# Patient Record
Sex: MALE | Race: OTHER | HISPANIC OR LATINO | URBAN - METROPOLITAN AREA
[De-identification: names, ages, dates, MRNs, and addresses within clinical notes are randomized per-mention and may not be internally consistent; named-entity substitution may affect disease eponyms.]

---

## 2022-06-07 ENCOUNTER — INPATIENT (INPATIENT)
Facility: HOSPITAL | Age: 57
LOS: 1 days | Discharge: ANOTHER HOSPITAL | DRG: 311 | End: 2022-06-09
Attending: GENERAL PRACTICE | Admitting: GENERAL PRACTICE
Payer: MEDICAID

## 2022-06-07 VITALS
WEIGHT: 154.98 LBS | TEMPERATURE: 98 F | DIASTOLIC BLOOD PRESSURE: 100 MMHG | SYSTOLIC BLOOD PRESSURE: 151 MMHG | HEIGHT: 66 IN | RESPIRATION RATE: 16 BRPM | HEART RATE: 85 BPM | OXYGEN SATURATION: 98 %

## 2022-06-07 DIAGNOSIS — R94.31 ABNORMAL ELECTROCARDIOGRAM [ECG] [EKG]: ICD-10-CM

## 2022-06-07 DIAGNOSIS — E78.5 HYPERLIPIDEMIA, UNSPECIFIED: ICD-10-CM

## 2022-06-07 DIAGNOSIS — Z29.9 ENCOUNTER FOR PROPHYLACTIC MEASURES, UNSPECIFIED: ICD-10-CM

## 2022-06-07 DIAGNOSIS — Z90.49 ACQUIRED ABSENCE OF OTHER SPECIFIED PARTS OF DIGESTIVE TRACT: Chronic | ICD-10-CM

## 2022-06-07 DIAGNOSIS — I10 ESSENTIAL (PRIMARY) HYPERTENSION: ICD-10-CM

## 2022-06-07 DIAGNOSIS — E11.9 TYPE 2 DIABETES MELLITUS WITHOUT COMPLICATIONS: ICD-10-CM

## 2022-06-07 LAB
ALBUMIN SERPL ELPH-MCNC: 4 G/DL — SIGNIFICANT CHANGE UP (ref 3.5–5)
ALP SERPL-CCNC: 78 U/L — SIGNIFICANT CHANGE UP (ref 40–120)
ALT FLD-CCNC: 57 U/L DA — SIGNIFICANT CHANGE UP (ref 10–60)
ANION GAP SERPL CALC-SCNC: 5 MMOL/L — SIGNIFICANT CHANGE UP (ref 5–17)
AST SERPL-CCNC: 32 U/L — SIGNIFICANT CHANGE UP (ref 10–40)
BASOPHILS # BLD AUTO: 0.06 K/UL — SIGNIFICANT CHANGE UP (ref 0–0.2)
BASOPHILS NFR BLD AUTO: 1.1 % — SIGNIFICANT CHANGE UP (ref 0–2)
BILIRUB SERPL-MCNC: 0.4 MG/DL — SIGNIFICANT CHANGE UP (ref 0.2–1.2)
BUN SERPL-MCNC: 16 MG/DL — SIGNIFICANT CHANGE UP (ref 7–18)
CALCIUM SERPL-MCNC: 9.8 MG/DL — SIGNIFICANT CHANGE UP (ref 8.4–10.5)
CHLORIDE SERPL-SCNC: 100 MMOL/L — SIGNIFICANT CHANGE UP (ref 96–108)
CO2 SERPL-SCNC: 31 MMOL/L — SIGNIFICANT CHANGE UP (ref 22–31)
CREAT SERPL-MCNC: 0.86 MG/DL — SIGNIFICANT CHANGE UP (ref 0.5–1.3)
EGFR: 102 ML/MIN/1.73M2 — SIGNIFICANT CHANGE UP
EOSINOPHIL # BLD AUTO: 0.2 K/UL — SIGNIFICANT CHANGE UP (ref 0–0.5)
EOSINOPHIL NFR BLD AUTO: 3.7 % — SIGNIFICANT CHANGE UP (ref 0–6)
GLUCOSE BLDC GLUCOMTR-MCNC: 292 MG/DL — HIGH (ref 70–99)
GLUCOSE SERPL-MCNC: 217 MG/DL — HIGH (ref 70–99)
HCT VFR BLD CALC: 43.3 % — SIGNIFICANT CHANGE UP (ref 39–50)
HGB BLD-MCNC: 14.7 G/DL — SIGNIFICANT CHANGE UP (ref 13–17)
IMM GRANULOCYTES NFR BLD AUTO: 0.4 % — SIGNIFICANT CHANGE UP (ref 0–1.5)
LIDOCAIN IGE QN: 68 U/L — LOW (ref 73–393)
LYMPHOCYTES # BLD AUTO: 1.65 K/UL — SIGNIFICANT CHANGE UP (ref 1–3.3)
LYMPHOCYTES # BLD AUTO: 30.6 % — SIGNIFICANT CHANGE UP (ref 13–44)
MAGNESIUM SERPL-MCNC: 2.1 MG/DL — SIGNIFICANT CHANGE UP (ref 1.6–2.6)
MCHC RBC-ENTMCNC: 28.1 PG — SIGNIFICANT CHANGE UP (ref 27–34)
MCHC RBC-ENTMCNC: 33.9 GM/DL — SIGNIFICANT CHANGE UP (ref 32–36)
MCV RBC AUTO: 82.6 FL — SIGNIFICANT CHANGE UP (ref 80–100)
MONOCYTES # BLD AUTO: 0.75 K/UL — SIGNIFICANT CHANGE UP (ref 0–0.9)
MONOCYTES NFR BLD AUTO: 13.9 % — SIGNIFICANT CHANGE UP (ref 2–14)
NEUTROPHILS # BLD AUTO: 2.71 K/UL — SIGNIFICANT CHANGE UP (ref 1.8–7.4)
NEUTROPHILS NFR BLD AUTO: 50.3 % — SIGNIFICANT CHANGE UP (ref 43–77)
NRBC # BLD: 0 /100 WBCS — SIGNIFICANT CHANGE UP (ref 0–0)
NT-PROBNP SERPL-SCNC: 10 PG/ML — SIGNIFICANT CHANGE UP (ref 0–125)
PLATELET # BLD AUTO: 301 K/UL — SIGNIFICANT CHANGE UP (ref 150–400)
POTASSIUM SERPL-MCNC: 4.8 MMOL/L — SIGNIFICANT CHANGE UP (ref 3.5–5.3)
POTASSIUM SERPL-SCNC: 4.8 MMOL/L — SIGNIFICANT CHANGE UP (ref 3.5–5.3)
PROT SERPL-MCNC: 7.9 G/DL — SIGNIFICANT CHANGE UP (ref 6–8.3)
RBC # BLD: 5.24 M/UL — SIGNIFICANT CHANGE UP (ref 4.2–5.8)
RBC # FLD: 11.7 % — SIGNIFICANT CHANGE UP (ref 10.3–14.5)
SARS-COV-2 RNA SPEC QL NAA+PROBE: SIGNIFICANT CHANGE UP
SODIUM SERPL-SCNC: 136 MMOL/L — SIGNIFICANT CHANGE UP (ref 135–145)
TROPONIN I, HIGH SENSITIVITY RESULT: 5.4 NG/L — SIGNIFICANT CHANGE UP
TROPONIN I, HIGH SENSITIVITY RESULT: 7.1 NG/L — SIGNIFICANT CHANGE UP
WBC # BLD: 5.39 K/UL — SIGNIFICANT CHANGE UP (ref 3.8–10.5)
WBC # FLD AUTO: 5.39 K/UL — SIGNIFICANT CHANGE UP (ref 3.8–10.5)

## 2022-06-07 PROCEDURE — 71045 X-RAY EXAM CHEST 1 VIEW: CPT | Mod: 26

## 2022-06-07 PROCEDURE — 99285 EMERGENCY DEPT VISIT HI MDM: CPT

## 2022-06-07 RX ORDER — ENOXAPARIN SODIUM 100 MG/ML
40 INJECTION SUBCUTANEOUS EVERY 24 HOURS
Refills: 0 | Status: DISCONTINUED | OUTPATIENT
Start: 2022-06-07 | End: 2022-06-08

## 2022-06-07 RX ORDER — ATORVASTATIN CALCIUM 80 MG/1
40 TABLET, FILM COATED ORAL AT BEDTIME
Refills: 0 | Status: DISCONTINUED | OUTPATIENT
Start: 2022-06-07 | End: 2022-06-09

## 2022-06-07 RX ORDER — ASPIRIN/CALCIUM CARB/MAGNESIUM 324 MG
81 TABLET ORAL DAILY
Refills: 0 | Status: DISCONTINUED | OUTPATIENT
Start: 2022-06-07 | End: 2022-06-09

## 2022-06-07 RX ORDER — INSULIN GLARGINE 100 [IU]/ML
30 INJECTION, SOLUTION SUBCUTANEOUS AT BEDTIME
Refills: 0 | Status: DISCONTINUED | OUTPATIENT
Start: 2022-06-07 | End: 2022-06-09

## 2022-06-07 RX ORDER — INSULIN LISPRO 100/ML
VIAL (ML) SUBCUTANEOUS AT BEDTIME
Refills: 0 | Status: DISCONTINUED | OUTPATIENT
Start: 2022-06-07 | End: 2022-06-09

## 2022-06-07 RX ORDER — LISINOPRIL 2.5 MG/1
20 TABLET ORAL DAILY
Refills: 0 | Status: DISCONTINUED | OUTPATIENT
Start: 2022-06-07 | End: 2022-06-09

## 2022-06-07 RX ORDER — HYDROCHLOROTHIAZIDE 25 MG
25 TABLET ORAL DAILY
Refills: 0 | Status: DISCONTINUED | OUTPATIENT
Start: 2022-06-07 | End: 2022-06-09

## 2022-06-07 RX ORDER — INSULIN LISPRO 100/ML
VIAL (ML) SUBCUTANEOUS
Refills: 0 | Status: DISCONTINUED | OUTPATIENT
Start: 2022-06-07 | End: 2022-06-09

## 2022-06-07 RX ADMIN — Medication 81 MILLIGRAM(S): at 22:53

## 2022-06-07 RX ADMIN — ATORVASTATIN CALCIUM 40 MILLIGRAM(S): 80 TABLET, FILM COATED ORAL at 22:53

## 2022-06-07 RX ADMIN — Medication 2: at 22:54

## 2022-06-07 RX ADMIN — INSULIN GLARGINE 30 UNIT(S): 100 INJECTION, SOLUTION SUBCUTANEOUS at 22:53

## 2022-06-07 NOTE — H&P ADULT - NSHPADDITIONALINFOADULT_GEN_ALL_CORE
Patient evaluated, case discussed with me, chart reviewed, agree with above H/P as reviewed.  Dr. LEONARDO Rivera ([phone])    pt with multiple risk factors including strong family history    likely would benefit from cath  d/w Dr Gage: likely transfer for cath in AM

## 2022-06-07 NOTE — ED PROVIDER NOTE - OBJECTIVE STATEMENT
55 yo male h/o HTN, HLD, IDDM p/w abnormal EKG from cardiologist Dr. Stewart office. Pt c/w MD pt has new TWI on EKG which he did not before. Has not acute complaints, Sent for admission for inpatient cardiac w/u. No cp, sob, dizziness or nausea. No f/c/cough. last stress test many years ago per pt.

## 2022-06-07 NOTE — H&P ADULT - PROBLEM SELECTOR PLAN 1
-c/w T wave inversion in lead II, III, AVF and lead V5 and V6 but not more than 1mm   -Asymptomatic  -Telemonitoring  -HEART score  -CHRISTO Score  -Will obtain ECHO  -Cardio Dr. Gage  -Pt will benefit with stress test , will do provider to RN for no coffee or tea or chocolate in morning -c/w T wave inversion in lead II, III, AVF and lead V5 and V6 but not more than 1mm   -Asymptomatic  -Telemonitoring  -HEART score 4 points moderate score (risk of amce of 12-16.6%)  -Will obtain ECHO  -Cardio Dr. Gage  -Pt will benefit with stress test , will do provider to RN for no coffee or tea or chocolate in morning -c/w T wave inversion in lead II, III, AVF and lead V5 and V6 but not more than 1mm   -Asymptomatic  -Telemonitoring  -HEART score 4 points moderate score (risk of amce of 12-16.6%)  -Will obtain ECHO  -Cardio Dr. Gage  - stress test vs cath         will do provider to RN for no coffee or tea or chocolate in morning

## 2022-06-07 NOTE — PATIENT PROFILE ADULT - FALL HARM RISK - UNIVERSAL INTERVENTIONS
Bed in lowest position, wheels locked, appropriate side rails in place/Call bell, personal items and telephone in reach/Instruct patient to call for assistance before getting out of bed or chair/Non-slip footwear when patient is out of bed/[place] to call system/Physically safe environment - no spills, clutter or unnecessary equipment/Purposeful Proactive Rounding/Room/bathroom lighting operational, light cord in reach

## 2022-06-07 NOTE — ED PROVIDER NOTE - CLINICAL SUMMARY MEDICAL DECISION MAKING FREE TEXT BOX
57 yo male with no cardiac history here for new t wave inversions at cardiologist office. Will perform labs, cardiac enzymes and admit for cardiac w/u.

## 2022-06-07 NOTE — H&P ADULT - HISTORY OF PRESENT ILLNESS
57 yo M, from home, ambulates independently, sent by his cardiologist for T wave inversion. Pt stated that he went to see his cardiologist today and found to have abnormal EKG. Pt denied having any chest pain,  N/V/D, headache, stents/CABG in the past. Endorsed to have mild shortness of breadth on exertion but not every time. Pt stated that last time he saw cardiologist 2 years ago and usually his PMD asked him to go and see cardio given his medical illnesses. he was hospitalized 10 years ago for ACS and had echo which was normal. Never had stress test in his life. Father had stent at the age of 50 or 55. Never smoke.  cardiologist Dr. Stewart   ED course: EKG showed TWI II, III, AVF, V5 and V6 , Trop neg x1

## 2022-06-07 NOTE — ED ADULT NURSE NOTE - ED STAT RN HANDOFF DETAILS
received pt.in bed at 1910 from madalyn lang/.pt.is awake and responsive.denies pain., on CM with NSR. transfer to rm 50A report given to madalyn taylor. not in distress

## 2022-06-07 NOTE — ED ADULT NURSE NOTE - OBJECTIVE STATEMENT
sent by pmd for abnormal EKG , pt states went to see pmd today just for regular check up   pt denies chest pains/ discomforts

## 2022-06-07 NOTE — H&P ADULT - NSHPPHYSICALEXAM_GEN_ALL_CORE
Vital Signs Last 24 Hrs  T(C): 36.8 (07 Jun 2022 16:30), Max: 36.8 (07 Jun 2022 16:30)  T(F): 98.2 (07 Jun 2022 16:30), Max: 98.2 (07 Jun 2022 16:30)  HR: 71 (07 Jun 2022 16:30) (71 - 85)  BP: 135/87 (07 Jun 2022 16:30) (135/87 - 151/100)  BP(mean): --  RR: 18 (07 Jun 2022 16:30) (16 - 18)  SpO2: 98% (07 Jun 2022 16:30) (98% - 98%)    GENERAL: NAD  EYES: EOMI, PERRLA,   NECK: Supple, No JVD  CHEST/LUNG: Clear to auscultation b/l  No rales, rhonchi, wheezing   HEART: Regular rate and rhythm; No murmurs, +ve S1 S2   ABDOMEN: Soft, Nontender, Nondistended; Bowel sounds present  NERVOUS SYSTEM:  Alert & Oriented X3, normal sensations and normal strength     EXTREMITIES:   No clubbing, cyanosis, or edema

## 2022-06-07 NOTE — H&P ADULT - PROBLEM SELECTOR PLAN 2
-On insulin glargine 60units at bedtime, Gemfibrozil 600mg BID, alogliptin -met 12.5-1000 BID  -Will resume 30 units of glargine with moderate sliding scale   -f/u hgbA1C  -FS achs

## 2022-06-07 NOTE — H&P ADULT - ASSESSMENT
55 yo M, from home, ambulates independently, sent by his cardiologist for T wave inversion. Pt stated that he went to see his cardiologist today and found to have abnormal EKG. Admitted for cardiac eval

## 2022-06-07 NOTE — H&P ADULT - NSICDXFAMILYHX_GEN_ALL_CORE_FT
FAMILY HISTORY:  Father  Still living? Unknown  Family history of stent, Age at diagnosis: Age Unknown

## 2022-06-08 LAB
A1C WITH ESTIMATED AVERAGE GLUCOSE RESULT: 12 % — HIGH (ref 4–5.6)
ALBUMIN SERPL ELPH-MCNC: 3.3 G/DL — LOW (ref 3.5–5)
ALP SERPL-CCNC: 66 U/L — SIGNIFICANT CHANGE UP (ref 40–120)
ALT FLD-CCNC: 55 U/L DA — SIGNIFICANT CHANGE UP (ref 10–60)
ANION GAP SERPL CALC-SCNC: 4 MMOL/L — LOW (ref 5–17)
AST SERPL-CCNC: 32 U/L — SIGNIFICANT CHANGE UP (ref 10–40)
BILIRUB SERPL-MCNC: 0.4 MG/DL — SIGNIFICANT CHANGE UP (ref 0.2–1.2)
BUN SERPL-MCNC: 16 MG/DL — SIGNIFICANT CHANGE UP (ref 7–18)
CALCIUM SERPL-MCNC: 9 MG/DL — SIGNIFICANT CHANGE UP (ref 8.4–10.5)
CHLORIDE SERPL-SCNC: 103 MMOL/L — SIGNIFICANT CHANGE UP (ref 96–108)
CHOLEST SERPL-MCNC: 157 MG/DL — SIGNIFICANT CHANGE UP
CO2 SERPL-SCNC: 30 MMOL/L — SIGNIFICANT CHANGE UP (ref 22–31)
CREAT SERPL-MCNC: 0.76 MG/DL — SIGNIFICANT CHANGE UP (ref 0.5–1.3)
EGFR: 106 ML/MIN/1.73M2 — SIGNIFICANT CHANGE UP
ESTIMATED AVERAGE GLUCOSE: 298 MG/DL — HIGH (ref 68–114)
GLUCOSE BLDC GLUCOMTR-MCNC: 147 MG/DL — HIGH (ref 70–99)
GLUCOSE BLDC GLUCOMTR-MCNC: 182 MG/DL — HIGH (ref 70–99)
GLUCOSE BLDC GLUCOMTR-MCNC: 235 MG/DL — HIGH (ref 70–99)
GLUCOSE BLDC GLUCOMTR-MCNC: 314 MG/DL — HIGH (ref 70–99)
GLUCOSE SERPL-MCNC: 185 MG/DL — HIGH (ref 70–99)
HCT VFR BLD CALC: 41 % — SIGNIFICANT CHANGE UP (ref 39–50)
HDLC SERPL-MCNC: 28 MG/DL — LOW
HGB BLD-MCNC: 13.9 G/DL — SIGNIFICANT CHANGE UP (ref 13–17)
LIPID PNL WITH DIRECT LDL SERPL: 72 MG/DL — SIGNIFICANT CHANGE UP
MAGNESIUM SERPL-MCNC: 2.1 MG/DL — SIGNIFICANT CHANGE UP (ref 1.6–2.6)
MCHC RBC-ENTMCNC: 28.2 PG — SIGNIFICANT CHANGE UP (ref 27–34)
MCHC RBC-ENTMCNC: 33.9 GM/DL — SIGNIFICANT CHANGE UP (ref 32–36)
MCV RBC AUTO: 83.2 FL — SIGNIFICANT CHANGE UP (ref 80–100)
NON HDL CHOLESTEROL: 129 MG/DL — SIGNIFICANT CHANGE UP
NRBC # BLD: 0 /100 WBCS — SIGNIFICANT CHANGE UP (ref 0–0)
PHOSPHATE SERPL-MCNC: 3.8 MG/DL — SIGNIFICANT CHANGE UP (ref 2.5–4.5)
PLATELET # BLD AUTO: 266 K/UL — SIGNIFICANT CHANGE UP (ref 150–400)
POTASSIUM SERPL-MCNC: 3.9 MMOL/L — SIGNIFICANT CHANGE UP (ref 3.5–5.3)
POTASSIUM SERPL-SCNC: 3.9 MMOL/L — SIGNIFICANT CHANGE UP (ref 3.5–5.3)
PROT SERPL-MCNC: 6.5 G/DL — SIGNIFICANT CHANGE UP (ref 6–8.3)
RBC # BLD: 4.93 M/UL — SIGNIFICANT CHANGE UP (ref 4.2–5.8)
RBC # FLD: 11.7 % — SIGNIFICANT CHANGE UP (ref 10.3–14.5)
SODIUM SERPL-SCNC: 137 MMOL/L — SIGNIFICANT CHANGE UP (ref 135–145)
TRIGL SERPL-MCNC: 285 MG/DL — HIGH
WBC # BLD: 4.49 K/UL — SIGNIFICANT CHANGE UP (ref 3.8–10.5)
WBC # FLD AUTO: 4.49 K/UL — SIGNIFICANT CHANGE UP (ref 3.8–10.5)

## 2022-06-08 RX ADMIN — Medication 4: at 17:06

## 2022-06-08 RX ADMIN — ENOXAPARIN SODIUM 40 MILLIGRAM(S): 100 INJECTION SUBCUTANEOUS at 06:17

## 2022-06-08 RX ADMIN — LISINOPRIL 20 MILLIGRAM(S): 2.5 TABLET ORAL at 06:17

## 2022-06-08 RX ADMIN — Medication 25 MILLIGRAM(S): at 06:17

## 2022-06-08 RX ADMIN — Medication 8: at 12:15

## 2022-06-08 RX ADMIN — INSULIN GLARGINE 30 UNIT(S): 100 INJECTION, SOLUTION SUBCUTANEOUS at 22:03

## 2022-06-08 RX ADMIN — Medication 81 MILLIGRAM(S): at 16:08

## 2022-06-08 RX ADMIN — ATORVASTATIN CALCIUM 40 MILLIGRAM(S): 80 TABLET, FILM COATED ORAL at 22:03

## 2022-06-08 NOTE — DISCHARGE NOTE PROVIDER - HOSPITAL COURSE
57 yo M, from home, ambulates independently, sent by his cardiologist for T wave inversion. Pt stated that he went to see his cardiolgist today and found to have abnormal EKG. Pt denied having any chest pain,  N/V/D, headache, stents/CABG in the past. Endorsed to have mild shortness of breadth on exertion but not everytime. Pt stated that last time he saw cardiologist 2 years ago and usually his PMD asked him to go and see cardio given his medical illnesses. he was hospitlized 10 years ago for ACS and had echo which was normal. Never had stress test in his life. Father had stent at the age of 50 or 55. Never smoke.    ED course: EKG showed   , Trop neg x1     Abnormal EKG. c/w T wave inversion in lead II, III, AVF and lead V5 and V6 but not more than 1mm. Asymptomatic. Telemonitoring. HEART score 4 points moderate score (risk of amce of 12-16.6%). Will obtain ECHO. Cardio Dr. Gage. Transfer to [place] for cath     Diabetes mellitus. insulin glargine 60units at bedtime, Gemfibrozil 600mg BID, alogliptin -met 12.5-1000 BID. Will resume 30 units of glargine with moderate sliding scale. f/u hgbA1C. FS achs.    HLD (hyperlipidemia). On Lipitor. Resume home med. f/u lipid profile.    HTN (hypertension). On lisinopril -HCTz. Resume home med. Monitor BP.    DVT prophylaxis. Lovenox dvt ppx.

## 2022-06-08 NOTE — DISCHARGE NOTE PROVIDER - NSDCMRMEDTOKEN_GEN_ALL_CORE_FT
alogliptin-metformin 12.5 mg-1000 mg oral tablet: 1 tab(s) orally 2 times a day  aspirin 81 mg oral delayed release tablet: 1 tab(s) orally once a day  gemfibrozil 600 mg oral tablet: 1 tab(s) orally 2 times a day  insulin glargine 100 units/mL subcutaneous solution: 60 unit(s) subcutaneous once a day (at bedtime)  Lipitor 40 mg oral tablet: 1 tab(s) orally once a day  lisinopril-hydrochlorothiazide 20 mg-25 mg oral tablet: 1 tab(s) orally once a day  Vitamin B12 1000 mcg oral tablet: 1 tab(s) orally once a day  Vitamin D3 25 mcg (1000 intl units) oral tablet: 1 tab(s) orally once a day   aspirin 81 mg oral delayed release tablet: 1 tab(s) orally once a day  clopidogrel 75 mg oral tablet: 1 tab(s) orally once a day  gemfibrozil 600 mg oral tablet: 1 tab(s) orally 2 times a day  insulin glargine 100 units/mL subcutaneous solution: 48 unit(s) subcutaneous once a day (at bedtime)  Lipitor 40 mg oral tablet: 1 tab(s) orally once a day  lisinopril-hydrochlorothiazide 20 mg-25 mg oral tablet: 1 tab(s) orally once a day  metFORMIN 1000 mg oral tablet: 1 tab(s) orally 2 times a day   HOLD restart on 6/12/2022  metoprolol tartrate 25 mg oral tablet: 1 tab(s) orally every 12 hours  Steglatro 5 mg oral tablet: 1 tab(s) orally once a day MDD:1  Vitamin B12 1000 mcg oral tablet: 1 tab(s) orally once a day  Vitamin D3 25 mcg (1000 intl units) oral tablet: 1 tab(s) orally once a day

## 2022-06-08 NOTE — DISCHARGE NOTE PROVIDER - NSDCCAREPROVSEEN_GEN_ALL_CORE_FT
Kristina, Pro Rivera, Brandon Gauthier, Karey Gage, Mainor Maya, Kimberly Terrell, Adia MEADOWS Brandon Miller

## 2022-06-08 NOTE — DISCHARGE NOTE PROVIDER - NSDCCPCAREPLAN_GEN_ALL_CORE_FT
PRINCIPAL DISCHARGE DIAGNOSIS  Diagnosis: ACS (acute coronary syndrome)  Assessment and Plan of Treatment: You presented to the hospital with t wave inversions sent in by your cardiologist. Your troponins were negative. Your EKG showed w T wave inversion in lead II, III, AVF and lead V5 and V6 but not more than 1mm. Cardiologist Dr. Gage was consulted. You are to be transferred to [facility] for cardiac cath for ischemic heart evaluation.      SECONDARY DISCHARGE DIAGNOSES  Diagnosis: Diabetes mellitus  Assessment and Plan of Treatment: You were on insuling sliding scale in hospital. Resume home meds upon discharge to home.    Diagnosis: HLD (hyperlipidemia)  Assessment and Plan of Treatment: You have history of hyperlipidemia. Continue home meds on discharge.    Diagnosis: HTN (hypertension)  Assessment and Plan of Treatment: You have history of hypertension. Continue home meds on discharge.

## 2022-06-09 ENCOUNTER — INPATIENT (INPATIENT)
Facility: HOSPITAL | Age: 57
LOS: 0 days | Discharge: HOME | DRG: 247 | End: 2022-06-10
Attending: STUDENT IN AN ORGANIZED HEALTH CARE EDUCATION/TRAINING PROGRAM | Admitting: STUDENT IN AN ORGANIZED HEALTH CARE EDUCATION/TRAINING PROGRAM
Payer: MEDICAID

## 2022-06-09 VITALS
TEMPERATURE: 98 F | DIASTOLIC BLOOD PRESSURE: 73 MMHG | OXYGEN SATURATION: 95 % | RESPIRATION RATE: 16 BRPM | SYSTOLIC BLOOD PRESSURE: 115 MMHG | HEART RATE: 77 BPM

## 2022-06-09 VITALS
OXYGEN SATURATION: 97 % | SYSTOLIC BLOOD PRESSURE: 139 MMHG | DIASTOLIC BLOOD PRESSURE: 89 MMHG | RESPIRATION RATE: 16 BRPM | HEART RATE: 92 BPM | WEIGHT: 151.02 LBS | HEIGHT: 66 IN | TEMPERATURE: 98 F

## 2022-06-09 DIAGNOSIS — Z90.49 ACQUIRED ABSENCE OF OTHER SPECIFIED PARTS OF DIGESTIVE TRACT: Chronic | ICD-10-CM

## 2022-06-09 DIAGNOSIS — I25.10 ATHEROSCLEROTIC HEART DISEASE OF NATIVE CORONARY ARTERY WITHOUT ANGINA PECTORIS: ICD-10-CM

## 2022-06-09 PROBLEM — E11.9 TYPE 2 DIABETES MELLITUS WITHOUT COMPLICATIONS: Chronic | Status: ACTIVE | Noted: 2022-06-07

## 2022-06-09 PROBLEM — E78.5 HYPERLIPIDEMIA, UNSPECIFIED: Chronic | Status: ACTIVE | Noted: 2022-06-07

## 2022-06-09 PROBLEM — I10 ESSENTIAL (PRIMARY) HYPERTENSION: Chronic | Status: ACTIVE | Noted: 2022-06-07

## 2022-06-09 LAB
ALBUMIN SERPL ELPH-MCNC: 3.5 G/DL — SIGNIFICANT CHANGE UP (ref 3.5–5)
ALP SERPL-CCNC: 70 U/L — SIGNIFICANT CHANGE UP (ref 40–120)
ALT FLD-CCNC: 60 U/L DA — SIGNIFICANT CHANGE UP (ref 10–60)
ANION GAP SERPL CALC-SCNC: 5 MMOL/L — SIGNIFICANT CHANGE UP (ref 5–17)
AST SERPL-CCNC: 32 U/L — SIGNIFICANT CHANGE UP (ref 10–40)
BILIRUB SERPL-MCNC: 0.5 MG/DL — SIGNIFICANT CHANGE UP (ref 0.2–1.2)
BUN SERPL-MCNC: 18 MG/DL — SIGNIFICANT CHANGE UP (ref 7–18)
CALCIUM SERPL-MCNC: 9.5 MG/DL — SIGNIFICANT CHANGE UP (ref 8.4–10.5)
CHLORIDE SERPL-SCNC: 103 MMOL/L — SIGNIFICANT CHANGE UP (ref 96–108)
CO2 SERPL-SCNC: 29 MMOL/L — SIGNIFICANT CHANGE UP (ref 22–31)
CREAT SERPL-MCNC: 0.84 MG/DL — SIGNIFICANT CHANGE UP (ref 0.5–1.3)
EGFR: 103 ML/MIN/1.73M2 — SIGNIFICANT CHANGE UP
GLUCOSE BLDC GLUCOMTR-MCNC: 166 MG/DL — HIGH (ref 70–99)
GLUCOSE BLDC GLUCOMTR-MCNC: 217 MG/DL — HIGH (ref 70–99)
GLUCOSE BLDC GLUCOMTR-MCNC: 287 MG/DL — HIGH (ref 70–99)
GLUCOSE BLDC GLUCOMTR-MCNC: 294 MG/DL — HIGH (ref 70–99)
GLUCOSE SERPL-MCNC: 158 MG/DL — HIGH (ref 70–99)
POTASSIUM SERPL-MCNC: 4.3 MMOL/L — SIGNIFICANT CHANGE UP (ref 3.5–5.3)
POTASSIUM SERPL-SCNC: 4.3 MMOL/L — SIGNIFICANT CHANGE UP (ref 3.5–5.3)
PROT SERPL-MCNC: 6.9 G/DL — SIGNIFICANT CHANGE UP (ref 6–8.3)
SODIUM SERPL-SCNC: 137 MMOL/L — SIGNIFICANT CHANGE UP (ref 135–145)

## 2022-06-09 PROCEDURE — 93010 ELECTROCARDIOGRAM REPORT: CPT

## 2022-06-09 PROCEDURE — 92928 PRQ TCAT PLMT NTRAC ST 1 LES: CPT | Mod: LD

## 2022-06-09 PROCEDURE — 36415 COLL VENOUS BLD VENIPUNCTURE: CPT

## 2022-06-09 PROCEDURE — 99285 EMERGENCY DEPT VISIT HI MDM: CPT

## 2022-06-09 PROCEDURE — 82962 GLUCOSE BLOOD TEST: CPT

## 2022-06-09 PROCEDURE — 83880 ASSAY OF NATRIURETIC PEPTIDE: CPT

## 2022-06-09 PROCEDURE — 84484 ASSAY OF TROPONIN QUANT: CPT

## 2022-06-09 PROCEDURE — 80061 LIPID PANEL: CPT

## 2022-06-09 PROCEDURE — 83690 ASSAY OF LIPASE: CPT

## 2022-06-09 PROCEDURE — 83735 ASSAY OF MAGNESIUM: CPT

## 2022-06-09 PROCEDURE — 87635 SARS-COV-2 COVID-19 AMP PRB: CPT

## 2022-06-09 PROCEDURE — 93005 ELECTROCARDIOGRAM TRACING: CPT

## 2022-06-09 PROCEDURE — 80053 COMPREHEN METABOLIC PANEL: CPT

## 2022-06-09 PROCEDURE — 93454 CORONARY ARTERY ANGIO S&I: CPT | Mod: 26,59

## 2022-06-09 PROCEDURE — 99152 MOD SED SAME PHYS/QHP 5/>YRS: CPT

## 2022-06-09 PROCEDURE — 93306 TTE W/DOPPLER COMPLETE: CPT

## 2022-06-09 PROCEDURE — 84100 ASSAY OF PHOSPHORUS: CPT

## 2022-06-09 PROCEDURE — 83036 HEMOGLOBIN GLYCOSYLATED A1C: CPT

## 2022-06-09 PROCEDURE — 85025 COMPLETE CBC W/AUTO DIFF WBC: CPT

## 2022-06-09 PROCEDURE — 85027 COMPLETE CBC AUTOMATED: CPT

## 2022-06-09 RX ORDER — HYDROCHLOROTHIAZIDE 25 MG
25 TABLET ORAL DAILY
Refills: 0 | Status: DISCONTINUED | OUTPATIENT
Start: 2022-06-09 | End: 2022-06-10

## 2022-06-09 RX ORDER — ATORVASTATIN CALCIUM 80 MG/1
1 TABLET, FILM COATED ORAL
Qty: 0 | Refills: 0 | DISCHARGE

## 2022-06-09 RX ORDER — LISINOPRIL 2.5 MG/1
20 TABLET ORAL DAILY
Refills: 0 | Status: DISCONTINUED | OUTPATIENT
Start: 2022-06-09 | End: 2022-06-10

## 2022-06-09 RX ORDER — CHOLECALCIFEROL (VITAMIN D3) 125 MCG
1 CAPSULE ORAL
Qty: 0 | Refills: 0 | DISCHARGE

## 2022-06-09 RX ORDER — SODIUM CHLORIDE 9 MG/ML
1000 INJECTION INTRAMUSCULAR; INTRAVENOUS; SUBCUTANEOUS
Refills: 0 | Status: DISCONTINUED | OUTPATIENT
Start: 2022-06-09 | End: 2022-06-10

## 2022-06-09 RX ORDER — ASPIRIN/CALCIUM CARB/MAGNESIUM 324 MG
81 TABLET ORAL DAILY
Refills: 0 | Status: DISCONTINUED | OUTPATIENT
Start: 2022-06-09 | End: 2022-06-10

## 2022-06-09 RX ORDER — LISINOPRIL/HYDROCHLOROTHIAZIDE 10-12.5 MG
1 TABLET ORAL
Qty: 0 | Refills: 0 | DISCHARGE

## 2022-06-09 RX ORDER — PREGABALIN 225 MG/1
1000 CAPSULE ORAL DAILY
Refills: 0 | Status: DISCONTINUED | OUTPATIENT
Start: 2022-06-09 | End: 2022-06-10

## 2022-06-09 RX ORDER — SODIUM CHLORIDE 9 MG/ML
3 INJECTION INTRAMUSCULAR; INTRAVENOUS; SUBCUTANEOUS EVERY 8 HOURS
Refills: 0 | Status: DISCONTINUED | OUTPATIENT
Start: 2022-06-09 | End: 2022-06-10

## 2022-06-09 RX ORDER — ASPIRIN/CALCIUM CARB/MAGNESIUM 324 MG
1 TABLET ORAL
Qty: 0 | Refills: 0 | DISCHARGE

## 2022-06-09 RX ORDER — PREGABALIN 225 MG/1
1 CAPSULE ORAL
Qty: 0 | Refills: 0 | DISCHARGE

## 2022-06-09 RX ORDER — INSULIN GLARGINE 100 [IU]/ML
48 INJECTION, SOLUTION SUBCUTANEOUS AT BEDTIME
Refills: 0 | Status: DISCONTINUED | OUTPATIENT
Start: 2022-06-09 | End: 2022-06-10

## 2022-06-09 RX ORDER — CHOLECALCIFEROL (VITAMIN D3) 125 MCG
1000 CAPSULE ORAL DAILY
Refills: 0 | Status: DISCONTINUED | OUTPATIENT
Start: 2022-06-09 | End: 2022-06-10

## 2022-06-09 RX ORDER — DEXTROSE 50 % IN WATER 50 %
15 SYRINGE (ML) INTRAVENOUS ONCE
Refills: 0 | Status: DISCONTINUED | OUTPATIENT
Start: 2022-06-09 | End: 2022-06-10

## 2022-06-09 RX ORDER — GEMFIBROZIL 600 MG
1 TABLET ORAL
Qty: 0 | Refills: 0 | DISCHARGE

## 2022-06-09 RX ORDER — DEXTROSE 50 % IN WATER 50 %
25 SYRINGE (ML) INTRAVENOUS ONCE
Refills: 0 | Status: DISCONTINUED | OUTPATIENT
Start: 2022-06-09 | End: 2022-06-10

## 2022-06-09 RX ORDER — SODIUM CHLORIDE 9 MG/ML
1000 INJECTION, SOLUTION INTRAVENOUS
Refills: 0 | Status: DISCONTINUED | OUTPATIENT
Start: 2022-06-09 | End: 2022-06-10

## 2022-06-09 RX ORDER — GEMFIBROZIL 600 MG
600 TABLET ORAL
Refills: 0 | Status: DISCONTINUED | OUTPATIENT
Start: 2022-06-09 | End: 2022-06-10

## 2022-06-09 RX ORDER — ATORVASTATIN CALCIUM 80 MG/1
40 TABLET, FILM COATED ORAL AT BEDTIME
Refills: 0 | Status: DISCONTINUED | OUTPATIENT
Start: 2022-06-09 | End: 2022-06-10

## 2022-06-09 RX ORDER — INSULIN LISPRO 100/ML
VIAL (ML) SUBCUTANEOUS AT BEDTIME
Refills: 0 | Status: DISCONTINUED | OUTPATIENT
Start: 2022-06-09 | End: 2022-06-10

## 2022-06-09 RX ORDER — GLUCAGON INJECTION, SOLUTION 0.5 MG/.1ML
1 INJECTION, SOLUTION SUBCUTANEOUS ONCE
Refills: 0 | Status: DISCONTINUED | OUTPATIENT
Start: 2022-06-09 | End: 2022-06-10

## 2022-06-09 RX ORDER — SODIUM CHLORIDE 9 MG/ML
250 INJECTION INTRAMUSCULAR; INTRAVENOUS; SUBCUTANEOUS ONCE
Refills: 0 | Status: COMPLETED | OUTPATIENT
Start: 2022-06-09 | End: 2022-06-09

## 2022-06-09 RX ORDER — DEXTROSE 50 % IN WATER 50 %
12.5 SYRINGE (ML) INTRAVENOUS ONCE
Refills: 0 | Status: DISCONTINUED | OUTPATIENT
Start: 2022-06-09 | End: 2022-06-10

## 2022-06-09 RX ORDER — SODIUM CHLORIDE 9 MG/ML
500 INJECTION INTRAMUSCULAR; INTRAVENOUS; SUBCUTANEOUS ONCE
Refills: 0 | Status: COMPLETED | OUTPATIENT
Start: 2022-06-09 | End: 2022-06-09

## 2022-06-09 RX ORDER — INSULIN LISPRO 100/ML
VIAL (ML) SUBCUTANEOUS
Refills: 0 | Status: DISCONTINUED | OUTPATIENT
Start: 2022-06-09 | End: 2022-06-10

## 2022-06-09 RX ADMIN — Medication 4: at 12:00

## 2022-06-09 RX ADMIN — SODIUM CHLORIDE 3 MILLILITER(S): 9 INJECTION INTRAMUSCULAR; INTRAVENOUS; SUBCUTANEOUS at 21:22

## 2022-06-09 RX ADMIN — ATORVASTATIN CALCIUM 40 MILLIGRAM(S): 80 TABLET, FILM COATED ORAL at 21:17

## 2022-06-09 RX ADMIN — Medication 6: at 15:36

## 2022-06-09 RX ADMIN — SODIUM CHLORIDE 500 MILLILITER(S): 9 INJECTION INTRAMUSCULAR; INTRAVENOUS; SUBCUTANEOUS at 16:16

## 2022-06-09 RX ADMIN — SODIUM CHLORIDE 75 MILLILITER(S): 9 INJECTION INTRAMUSCULAR; INTRAVENOUS; SUBCUTANEOUS at 16:05

## 2022-06-09 RX ADMIN — Medication 81 MILLIGRAM(S): at 12:01

## 2022-06-09 RX ADMIN — LISINOPRIL 20 MILLIGRAM(S): 2.5 TABLET ORAL at 05:33

## 2022-06-09 RX ADMIN — SODIUM CHLORIDE 83.33 MILLILITER(S): 9 INJECTION INTRAMUSCULAR; INTRAVENOUS; SUBCUTANEOUS at 21:14

## 2022-06-09 RX ADMIN — INSULIN GLARGINE 48 UNIT(S): 100 INJECTION, SOLUTION SUBCUTANEOUS at 21:14

## 2022-06-09 RX ADMIN — Medication 25 MILLIGRAM(S): at 05:33

## 2022-06-09 RX ADMIN — Medication 2: at 08:17

## 2022-06-09 RX ADMIN — Medication 1: at 21:15

## 2022-06-09 NOTE — PROGRESS NOTE ADULT - ASSESSMENT
M E D I C A L   A T T E N D I N G    F O L L O W    U P   N O T E  (06-09-22 )                                     ------------------------------------------------------------------------------------------------    patient evaluated by me, case discussed with team, chart, medications, and physical exam reviewed, labs / tests  and vitals reviewed by me, as doni.   Patient is stable for transfer to [place] for cardiac cath   Patient to follow up with  cadio team at [place]   See discharge document for full note.  dicussed with house staff.. cardio ..                             ( note written / Date of service  06-09-22 )    ==================>> MEDICATIONS <<====================    aspirin enteric coated 81 milliGRAM(s) Oral daily  atorvastatin 40 milliGRAM(s) Oral at bedtime  hydrochlorothiazide 25 milliGRAM(s) Oral daily  insulin glargine Injectable (LANTUS) 30 Unit(s) SubCutaneous at bedtime  insulin lispro (ADMELOG) corrective regimen sliding scale   SubCutaneous three times a day before meals  insulin lispro (ADMELOG) corrective regimen sliding scale   SubCutaneous at bedtime  lisinopril 20 milliGRAM(s) Oral daily    ___________  Active diet:  Diet, Regular:   Consistent Carbohydrate No Snacks  DASH/TLC Sodium & Cholesterol Restricted  ___________________    ==================>> VITAL SIGNS <<==================    T(C): 36.6 (06-09-22 @ 11:16), Max: 36.8 (06-08-22 @ 15:32)  HR: 77 (06-09-22 @ 11:16) (76 - 99)  BP: 115/73 (06-09-22 @ 11:16) (106/69 - 142/91)  BP(mean): --  RR: 16 (06-09-22 @ 11:16) (16 - 20)  SpO2: 95% (06-09-22 @ 11:16) (95% - 98%)     CAPILLARY BLOOD GLUCOSE  POCT Blood Glucose.: 217 mg/dL (09 Jun 2022 11:44)  POCT Blood Glucose.: 166 mg/dL (09 Jun 2022 07:49)  POCT Blood Glucose.: 147 mg/dL (08 Jun 2022 21:08)  POCT Blood Glucose.: 235 mg/dL (08 Jun 2022 16:29)     ==================>> LAB AND IMAGING <<==================                        13.9   4.49  )-----------( 266      ( 08 Jun 2022 06:27 )             41.0        06-09    137  |  103  |  18  ----------------------------<  158<H>  4.3   |  29  |  0.84    Ca    9.5      09 Jun 2022 07:02  Phos  3.8     06-08  Mg     2.1     06-08    TPro  6.9  /  Alb  3.5  /  TBili  0.5  /  DBili  x   /  AST  32  /  ALT  60  /  AlkPhos  70  06-09    Lipid profile:  (06-08-22)     Total: 157     LDL  : (p)     HDL  :28     TG   :285     HgA1C:   (06-08-22)            (06-08-22)      12.0  WBC count:   4.49 <<== ,  5.39 <<==   Hemoglobin:   13.9 <<==,  14.7 <<==  platelets:  266 <==, 301 <==    Creatinine:  0.84  <<==, 0.76  <<==, 0.86  <<==  Sodium:   137  <==, 137  <==, 136  <==       AST:          32 <== , 32 <== , 32 <==      ALT:        60  <== , 55  <== , 57  <==      AP:        70  <=, 66  <=, 78  <=     Bili:        0.5  <=, 0.4  <=, 0.4  <=       < from: Transthoracic Echocardiogram (06.08.22 @ 07:29) >  CONCLUSIONS:  1. Increased relative wall thickness with normal left  ventricular (LV) mass index, consistent with concentric LV remodeling.  2. Endocardium not well visualized; grossly normal left  ventricular systolic function.  3. Grade I diastolic dysfunction (Impaired relaxation, mild).  4. Normal right ventricular size and function.  < end of copied text >  
  _________________________________________________________________________________________  ========>>  M E D I C A L   A T T E N D I N G    F O L L O W  U P  N O T E  <<=========  -----------------------------------------------------------------------------------------------------    - Patient seen and examined by me earlier today.   - In summary,  YESSI IRVIN is a 56y year old man admitted with   - Patient today overall doing ok, comfortable, eating OK. mild h/a otherwise doing ok     ==================>> REVIEW OF SYSTEM <<=================    GEN: no fever, no chills, no pain  RESP: no SOB, no cough, no sputum  CVS: no chest pain, no palpitations, no edema  GI: no abdominal pain, no nausea, no constipation, no diarrhea  : no dysuria, no frequency, no hematuria  Neuro: no headache, no dizziness  Derm : no itching, no rash    ==================>> PHYSICAL EXAM <<=================    GEN: A&O X 3 , NAD , comfortable, pleasant, calm   HEENT: NCAT, PERRL, MMM, hearing intact  Neck: supple , no JVD appreciated  CVS: S1S2 , regular , No M/R/G appreciated  PULM: CTA B/L,  no W/R/R appreciated  ABD.: soft. non tender, non distended,  bowel sounds present  Extrem: intact pulses , no edema   PSYCH : normal mood,  not anxious                            ( Note Written / Date of service :  06-08-22 )    ==================>> MEDICATIONS <<====================    MEDICATIONS  (STANDING):  aspirin enteric coated 81 milliGRAM(s) Oral daily  atorvastatin 40 milliGRAM(s) Oral at bedtime  enoxaparin Injectable 40 milliGRAM(s) SubCutaneous every 24 hours  hydrochlorothiazide 25 milliGRAM(s) Oral daily  insulin glargine Injectable (LANTUS) 30 Unit(s) SubCutaneous at bedtime  insulin lispro (ADMELOG) corrective regimen sliding scale   SubCutaneous three times a day before meals  insulin lispro (ADMELOG) corrective regimen sliding scale   SubCutaneous at bedtime  lisinopril 20 milliGRAM(s) Oral daily    ___________  Active diet:  Diet, Regular:   Consistent Carbohydrate No Snacks  DASH/TLC Sodium & Cholesterol Restricted  ___________________    ==================>> VITAL SIGNS <<==================    T(C): 36.7 (06-08-22 @ 11:18), Max: 36.8 (06-07-22 @ 16:30)  HR: 86 (06-08-22 @ 11:18) (71 - 86)  BP: 110/70 (06-08-22 @ 11:18) (110/70 - 165/98)  BP(mean): --  RR: 16 (06-08-22 @ 11:18) (16 - 20)  SpO2: 97% (06-08-22 @ 11:18) (97% - 98%)     CAPILLARY BLOOD GLUCOSE  POCT Blood Glucose.: 314 mg/dL (08 Jun 2022 11:56)  POCT Blood Glucose.: 182 mg/dL (08 Jun 2022 08:18)  POCT Blood Glucose.: 292 mg/dL (07 Jun 2022 22:51)     ==================>> LAB AND IMAGING <<==================                        13.9   4.49  )-----------( 266      ( 08 Jun 2022 06:27 )             41.0        06-08    137  |  103  |  16  ----------------------------<  185<H>  3.9   |  30  |  0.76    Ca    9.0      08 Jun 2022 06:27  Phos  3.8     06-08  Mg     2.1     06-08    TPro  6.5  /  Alb  3.3<L>  /  TBili  0.4  /  DBili  x   /  AST  32  /  ALT  55  /  AlkPhos  66  06-08                Lipid profile:  (06-08-22)     Total: 157     LDL  : (p)     HDL  :28     TG   :285     HgA1C:   (06-08-22)          (06-08-22)      12.0    ___________________________________________________________________________________  ===============>>  A S S E S S M E N T   A N D   P L A N <<===============  ------------------------------------------------------------------------------------------    · Assessment	  57 yo M, from home, ambulates independently, sent by his cardiologist for T wave inversion. Pt stated that he went to see his cardiologist today and found to have abnormal EKG. Admitted for cardiac eval     Problem/Plan - 1:  ·  Problem: Abnormal EKG.   -Telemonitoring  -obtain ECHO  -Cardio   - plan for cath   - Continue Current medications otherwise and monitor.    Problem/Plan - 2:  ·  Problem: Diabetes mellitus.   ·  Plan: -On insulin glargine 60units at bedtime, alogliptin -met 12.5-1000 BID  -Will resume 30 units of glargine with moderate sliding scale   -f/u hgbA1C  -FS achs.    Problem/Plan - 3:  ·  Problem: HLD (hyperlipidemia).   ·  Plan: On Lipitor + Gemfibrozil   Resume home med     Problem/Plan - 4:  ·  Problem: HTN (hypertension).   ·  Plan: On lisinopril -HCTz  Resume home med  Monitor BP.    Problem/Plan - 5:  ·  Problem: DVT prophylaxis.   ·  Plan: Lovenox dvt ppx.    --------------------------------------------  Case discussed with pt, HS, cardio  Education given on findings and plan of care  ___________________________  H. ALEXANDRA Rivera.  Pager: [phone]

## 2022-06-09 NOTE — H&P CARDIOLOGY - HISTORY OF PRESENT ILLNESS
[facility]    55 yo M, PMhx HTN, HLD, DM  from home, ambulates independently, sent by his cardiologist for T wave inversion. Pt stated that he went to see his cardiologist today and found to have abnormal EKG. Pt denied having any chest pain,  N/V/D, headache, t. Endorsed to have mild shortness of breadth on exertion but not every time. Pt stated that last time he saw cardiologist 2 years ago and  his PMD asked him to go and see cardio given his medical illnesses. He was hospitalized 10 years ago for ACS and had echo which was normal. Never had stress test in his life. Father had stent at the age of 50 or 55. Never smoke.  cardiologist Dr. Stewart     ED course: EKG showed TWI II, III, AVF, V5 and V6 , Trop neg x1     (end of copied text)     Patient transferred here for cardiac cath .  (patient transferred from [facility])    55 yo M, PMhx HTN, HLD, DM  from home, ambulates independently, sent by his cardiologist for T wave inversion. Pt stated that he went to see his cardiologist today and found to have abnormal EKG. Patient  denied having any chest pain,  N/V/D, headache. Endorsed to have mild shortness of breadth on exertion but not every time. Pt stated that last time he saw cardiologist 2 years ago and  his PMD asked him to go and see cardio given his medical illnesses. He was hospitalized 10 years ago for ACS and had echo which was normal. Never had stress test in his life. Father had stent at the age of 50 or 55. Never smoke.  cardiologist Dr. Stewart     ED course: EKG showed TWI II, III, AVF, V5 and V6 , Trop neg x1     (end of copied text)     Patient transferred here for cardiac cath .

## 2022-06-10 VITALS
SYSTOLIC BLOOD PRESSURE: 105 MMHG | TEMPERATURE: 98 F | DIASTOLIC BLOOD PRESSURE: 73 MMHG | RESPIRATION RATE: 16 BRPM | HEART RATE: 77 BPM | OXYGEN SATURATION: 96 %

## 2022-06-10 DIAGNOSIS — I10 ESSENTIAL (PRIMARY) HYPERTENSION: ICD-10-CM

## 2022-06-10 DIAGNOSIS — E11.65 TYPE 2 DIABETES MELLITUS WITH HYPERGLYCEMIA: ICD-10-CM

## 2022-06-10 DIAGNOSIS — E78.5 HYPERLIPIDEMIA, UNSPECIFIED: ICD-10-CM

## 2022-06-10 LAB
ANION GAP SERPL CALC-SCNC: 11 MMOL/L — SIGNIFICANT CHANGE UP (ref 5–17)
BUN SERPL-MCNC: 18 MG/DL — SIGNIFICANT CHANGE UP (ref 7–23)
CALCIUM SERPL-MCNC: 9.1 MG/DL — SIGNIFICANT CHANGE UP (ref 8.4–10.5)
CHLORIDE SERPL-SCNC: 100 MMOL/L — SIGNIFICANT CHANGE UP (ref 96–108)
CK MB CFR SERPL CALC: 1.8 NG/ML — SIGNIFICANT CHANGE UP (ref 0–6.7)
CK SERPL-CCNC: 71 U/L — SIGNIFICANT CHANGE UP (ref 30–200)
CO2 SERPL-SCNC: 24 MMOL/L — SIGNIFICANT CHANGE UP (ref 22–31)
CREAT SERPL-MCNC: 0.8 MG/DL — SIGNIFICANT CHANGE UP (ref 0.5–1.3)
EGFR: 105 ML/MIN/1.73M2 — SIGNIFICANT CHANGE UP
GLUCOSE BLDC GLUCOMTR-MCNC: 126 MG/DL — HIGH (ref 70–99)
GLUCOSE BLDC GLUCOMTR-MCNC: 162 MG/DL — HIGH (ref 70–99)
GLUCOSE BLDC GLUCOMTR-MCNC: 271 MG/DL — HIGH (ref 70–99)
GLUCOSE BLDC GLUCOMTR-MCNC: 275 MG/DL — HIGH (ref 70–99)
GLUCOSE SERPL-MCNC: 228 MG/DL — HIGH (ref 70–99)
HCT VFR BLD CALC: 41 % — SIGNIFICANT CHANGE UP (ref 39–50)
HGB BLD-MCNC: 14.2 G/DL — SIGNIFICANT CHANGE UP (ref 13–17)
MCHC RBC-ENTMCNC: 28.7 PG — SIGNIFICANT CHANGE UP (ref 27–34)
MCHC RBC-ENTMCNC: 34.6 GM/DL — SIGNIFICANT CHANGE UP (ref 32–36)
MCV RBC AUTO: 83 FL — SIGNIFICANT CHANGE UP (ref 80–100)
NRBC # BLD: 0 /100 WBCS — SIGNIFICANT CHANGE UP (ref 0–0)
PLATELET # BLD AUTO: 254 K/UL — SIGNIFICANT CHANGE UP (ref 150–400)
POTASSIUM SERPL-MCNC: 4 MMOL/L — SIGNIFICANT CHANGE UP (ref 3.5–5.3)
POTASSIUM SERPL-SCNC: 4 MMOL/L — SIGNIFICANT CHANGE UP (ref 3.5–5.3)
RBC # BLD: 4.94 M/UL — SIGNIFICANT CHANGE UP (ref 4.2–5.8)
RBC # FLD: 11.9 % — SIGNIFICANT CHANGE UP (ref 10.3–14.5)
SODIUM SERPL-SCNC: 135 MMOL/L — SIGNIFICANT CHANGE UP (ref 135–145)
TROPONIN T, HIGH SENSITIVITY RESULT: 35 NG/L — SIGNIFICANT CHANGE UP (ref 0–51)
WBC # BLD: 4.98 K/UL — SIGNIFICANT CHANGE UP (ref 3.8–10.5)
WBC # FLD AUTO: 4.98 K/UL — SIGNIFICANT CHANGE UP (ref 3.8–10.5)

## 2022-06-10 PROCEDURE — 99223 1ST HOSP IP/OBS HIGH 75: CPT | Mod: GC

## 2022-06-10 PROCEDURE — C1887: CPT

## 2022-06-10 PROCEDURE — C1769: CPT

## 2022-06-10 PROCEDURE — 82962 GLUCOSE BLOOD TEST: CPT

## 2022-06-10 PROCEDURE — 82550 ASSAY OF CK (CPK): CPT

## 2022-06-10 PROCEDURE — C1874: CPT

## 2022-06-10 PROCEDURE — 84484 ASSAY OF TROPONIN QUANT: CPT

## 2022-06-10 PROCEDURE — 80048 BASIC METABOLIC PNL TOTAL CA: CPT

## 2022-06-10 PROCEDURE — 99152 MOD SED SAME PHYS/QHP 5/>YRS: CPT

## 2022-06-10 PROCEDURE — 85027 COMPLETE CBC AUTOMATED: CPT

## 2022-06-10 PROCEDURE — 93454 CORONARY ARTERY ANGIO S&I: CPT | Mod: 59

## 2022-06-10 PROCEDURE — 82553 CREATINE MB FRACTION: CPT

## 2022-06-10 PROCEDURE — 93005 ELECTROCARDIOGRAM TRACING: CPT

## 2022-06-10 PROCEDURE — C1725: CPT

## 2022-06-10 PROCEDURE — 93010 ELECTROCARDIOGRAM REPORT: CPT

## 2022-06-10 PROCEDURE — C1894: CPT

## 2022-06-10 PROCEDURE — C9600: CPT | Mod: LD

## 2022-06-10 PROCEDURE — 99238 HOSP IP/OBS DSCHRG MGMT 30/<: CPT

## 2022-06-10 PROCEDURE — 99153 MOD SED SAME PHYS/QHP EA: CPT

## 2022-06-10 RX ORDER — METOPROLOL TARTRATE 50 MG
1 TABLET ORAL
Qty: 60 | Refills: 0
Start: 2022-06-10 | End: 2022-07-09

## 2022-06-10 RX ORDER — METOPROLOL TARTRATE 50 MG
25 TABLET ORAL EVERY 12 HOURS
Refills: 0 | Status: DISCONTINUED | OUTPATIENT
Start: 2022-06-10 | End: 2022-06-10

## 2022-06-10 RX ORDER — INSULIN GLARGINE 100 [IU]/ML
48 INJECTION, SOLUTION SUBCUTANEOUS
Qty: 0 | Refills: 0 | DISCHARGE

## 2022-06-10 RX ORDER — INSULIN GLARGINE 100 [IU]/ML
60 INJECTION, SOLUTION SUBCUTANEOUS
Qty: 0 | Refills: 0 | DISCHARGE

## 2022-06-10 RX ORDER — METFORMIN HYDROCHLORIDE 850 MG/1
1 TABLET ORAL
Qty: 60 | Refills: 0
Start: 2022-06-10 | End: 2022-07-09

## 2022-06-10 RX ORDER — MORPHINE SULFATE 50 MG/1
2 CAPSULE, EXTENDED RELEASE ORAL ONCE
Refills: 0 | Status: DISCONTINUED | OUTPATIENT
Start: 2022-06-10 | End: 2022-06-10

## 2022-06-10 RX ORDER — DULAGLUTIDE 4.5 MG/.5ML
0.75 INJECTION, SOLUTION SUBCUTANEOUS
Qty: 1 | Refills: 0
Start: 2022-06-10 | End: 2022-07-09

## 2022-06-10 RX ORDER — ERTUGLIFLOZIN 5 MG/1
1 TABLET, FILM COATED ORAL
Qty: 30 | Refills: 0
Start: 2022-06-10 | End: 2022-07-09

## 2022-06-10 RX ORDER — EMPAGLIFLOZIN 10 MG/1
1 TABLET, FILM COATED ORAL
Qty: 30 | Refills: 0
Start: 2022-06-10 | End: 2022-07-09

## 2022-06-10 RX ORDER — ALOGLIPTIN AND METFORMIN HYDROCHLORIDE 12.5; 5 MG/1; MG/1
1 TABLET, FILM COATED ORAL
Qty: 0 | Refills: 0 | DISCHARGE

## 2022-06-10 RX ORDER — CLOPIDOGREL BISULFATE 75 MG/1
1 TABLET, FILM COATED ORAL
Qty: 90 | Refills: 3
Start: 2022-06-10 | End: 2023-06-04

## 2022-06-10 RX ORDER — METOPROLOL TARTRATE 50 MG
1 TABLET ORAL
Qty: 0 | Refills: 0 | DISCHARGE
Start: 2022-06-10

## 2022-06-10 RX ORDER — INSULIN GLARGINE 100 [IU]/ML
48 INJECTION, SOLUTION SUBCUTANEOUS
Qty: 1 | Refills: 0
Start: 2022-06-10 | End: 2022-07-09

## 2022-06-10 RX ORDER — INSULIN LISPRO 100/ML
4 VIAL (ML) SUBCUTANEOUS ONCE
Refills: 0 | Status: COMPLETED | OUTPATIENT
Start: 2022-06-10 | End: 2022-06-10

## 2022-06-10 RX ORDER — CLOPIDOGREL BISULFATE 75 MG/1
75 TABLET, FILM COATED ORAL DAILY
Refills: 0 | Status: DISCONTINUED | OUTPATIENT
Start: 2022-06-10 | End: 2022-06-10

## 2022-06-10 RX ADMIN — Medication 6: at 16:12

## 2022-06-10 RX ADMIN — Medication 81 MILLIGRAM(S): at 05:44

## 2022-06-10 RX ADMIN — MORPHINE SULFATE 2 MILLIGRAM(S): 50 CAPSULE, EXTENDED RELEASE ORAL at 01:50

## 2022-06-10 RX ADMIN — SODIUM CHLORIDE 3 MILLILITER(S): 9 INJECTION INTRAMUSCULAR; INTRAVENOUS; SUBCUTANEOUS at 05:42

## 2022-06-10 RX ADMIN — SODIUM CHLORIDE 3 MILLILITER(S): 9 INJECTION INTRAMUSCULAR; INTRAVENOUS; SUBCUTANEOUS at 13:42

## 2022-06-10 RX ADMIN — LISINOPRIL 20 MILLIGRAM(S): 2.5 TABLET ORAL at 05:44

## 2022-06-10 RX ADMIN — PREGABALIN 1000 MICROGRAM(S): 225 CAPSULE ORAL at 12:36

## 2022-06-10 RX ADMIN — Medication 1000 UNIT(S): at 12:35

## 2022-06-10 RX ADMIN — Medication 600 MILLIGRAM(S): at 16:29

## 2022-06-10 RX ADMIN — CLOPIDOGREL BISULFATE 75 MILLIGRAM(S): 75 TABLET, FILM COATED ORAL at 05:43

## 2022-06-10 RX ADMIN — Medication 25 MILLIGRAM(S): at 16:29

## 2022-06-10 RX ADMIN — MORPHINE SULFATE 2 MILLIGRAM(S): 50 CAPSULE, EXTENDED RELEASE ORAL at 01:35

## 2022-06-10 RX ADMIN — Medication 25 MILLIGRAM(S): at 05:44

## 2022-06-10 RX ADMIN — Medication 25 MILLIGRAM(S): at 01:56

## 2022-06-10 RX ADMIN — Medication 600 MILLIGRAM(S): at 05:44

## 2022-06-10 RX ADMIN — Medication 4 UNIT(S): at 12:35

## 2022-06-10 NOTE — DISCHARGE NOTE PROVIDER - NSDCMRMEDTOKEN_GEN_ALL_CORE_FT
alogliptin-metformin 12.5 mg-1000 mg oral tablet: 1 tab(s) orally 2 times a day. DO NOT TAKE on 6/10 or 6/11, restart on Sunday 6/12.  aspirin 81 mg oral delayed release tablet: 1 tab(s) orally once a day  gemfibrozil 600 mg oral tablet: 1 tab(s) orally 2 times a day  insulin glargine 100 units/mL subcutaneous solution: 60 unit(s) subcutaneous once a day (at bedtime)  Lipitor 40 mg oral tablet: 1 tab(s) orally once a day  lisinopril-hydrochlorothiazide 20 mg-25 mg oral tablet: 1 tab(s) orally once a day  Vitamin B12 1000 mcg oral tablet: 1 tab(s) orally once a day  Vitamin D3 25 mcg (1000 intl units) oral tablet: 1 tab(s) orally once a day   alogliptin-metformin 12.5 mg-1000 mg oral tablet: 1 tab(s) orally 2 times a day. DO NOT TAKE on 6/10 or 6/11, restart on Sunday 6/12.  aspirin 81 mg oral delayed release tablet: 1 tab(s) orally once a day  clopidogrel 75 mg oral tablet: 1 tab(s) orally once a day  gemfibrozil 600 mg oral tablet: 1 tab(s) orally 2 times a day  insulin glargine 100 units/mL subcutaneous solution: 60 unit(s) subcutaneous once a day (at bedtime)  Lipitor 40 mg oral tablet: 1 tab(s) orally once a day  lisinopril-hydrochlorothiazide 20 mg-25 mg oral tablet: 1 tab(s) orally once a day  metoprolol tartrate 25 mg oral tablet: 1 tab(s) orally every 12 hours  Vitamin B12 1000 mcg oral tablet: 1 tab(s) orally once a day  Vitamin D3 25 mcg (1000 intl units) oral tablet: 1 tab(s) orally once a day   aspirin 81 mg oral delayed release tablet: 1 tab(s) orally once a day  clopidogrel 75 mg oral tablet: 1 tab(s) orally once a day  gemfibrozil 600 mg oral tablet: 1 tab(s) orally 2 times a day  insulin glargine 100 units/mL subcutaneous solution: 48 unit(s) subcutaneous once a day (at bedtime)  Jardiance 10 mg oral tablet: 1 tab(s) orally once a day   Lipitor 40 mg oral tablet: 1 tab(s) orally once a day  lisinopril-hydrochlorothiazide 20 mg-25 mg oral tablet: 1 tab(s) orally once a day  metFORMIN 1000 mg oral tablet: 1 tab(s) orally 2 times a day   metoprolol tartrate 25 mg oral tablet: 1 tab(s) orally every 12 hours  Trulicity Pen 0.75 mg/0.5 mL subcutaneous solution: 0.75 milligram(s) subcutaneously once a week   Vitamin B12 1000 mcg oral tablet: 1 tab(s) orally once a day  Vitamin D3 25 mcg (1000 intl units) oral tablet: 1 tab(s) orally once a day   aspirin 81 mg oral delayed release tablet: 1 tab(s) orally once a day  clopidogrel 75 mg oral tablet: 1 tab(s) orally once a day  gemfibrozil 600 mg oral tablet: 1 tab(s) orally 2 times a day  Lipitor 40 mg oral tablet: 1 tab(s) orally once a day  lisinopril-hydrochlorothiazide 20 mg-25 mg oral tablet: 1 tab(s) orally once a day  metFORMIN 1000 mg oral tablet: 1 tab(s) orally 2 times a day   HOLD restart on 6/12/2022  metoprolol tartrate 25 mg oral tablet: 1 tab(s) orally every 12 hours  Steglatro 5 mg oral tablet: 1 tab(s) orally once a day MDD:1  Trulicity Pen 0.75 mg/0.5 mL subcutaneous solution: 0.75 milligram(s) subcutaneously once a week   Vitamin B12 1000 mcg oral tablet: 1 tab(s) orally once a day  Vitamin D3 25 mcg (1000 intl units) oral tablet: 1 tab(s) orally once a day   aspirin 81 mg oral delayed release tablet: 1 tab(s) orally once a day  clopidogrel 75 mg oral tablet: 1 tab(s) orally once a day  gemfibrozil 600 mg oral tablet: 1 tab(s) orally 2 times a day  insulin glargine 100 units/mL subcutaneous solution: 48 unit(s) subcutaneous once a day (at bedtime)  Lipitor 40 mg oral tablet: 1 tab(s) orally once a day  lisinopril-hydrochlorothiazide 20 mg-25 mg oral tablet: 1 tab(s) orally once a day  metFORMIN 1000 mg oral tablet: 1 tab(s) orally 2 times a day   HOLD restart on 6/12/2022  metoprolol tartrate 25 mg oral tablet: 1 tab(s) orally every 12 hours  Steglatro 5 mg oral tablet: 1 tab(s) orally once a day MDD:1  Trulicity Pen 0.75 mg/0.5 mL subcutaneous solution: 0.75 milligram(s) subcutaneously once a week   Vitamin B12 1000 mcg oral tablet: 1 tab(s) orally once a day  Vitamin D3 25 mcg (1000 intl units) oral tablet: 1 tab(s) orally once a day

## 2022-06-10 NOTE — DISCHARGE NOTE PROVIDER - NSDCFUADDINST_GEN_ALL_CORE_FT
Wound Care:   the day AFTER your procedure remove bandage GENTTLY, and clean using  mild soap and gentle warm, water stream, pat dry. leave OPEN to air. YOU MAY SHOWER   DO NOT apply lotions, creams, ointments, powder, parfumes to your incision site  DO NOT SOAK your site for 1 week ( no baths, no pools, no tubs, etc...)  Check  your groin and /or wirst daily.A small amount of bruising, and soarness are normal    ACTIVITY: for 24 hours   - DO NOT DRIVE  - DO NOT make any important decisions or sign legal documents   - DO NOT operate heavy machinaries   - you may resume sexual activity in 48 hours, unless otherwise instructed by your cardiologist     If your procedure was done through the WRIST: for the NEXT 3DAYS:  - avoid pushing, pulling, with that affected wrist   - avoid repeated movement of that hand and wrist ( eg: typing, hammering)  - DO NOT LIFT anything more than 5 lbs     If your procedure was done through the GROIN: for the NEXT 5 DAYS  - Limit climbing stairs, DO NOT soak in bathtub or pool  - no strenous activities, pushing, pulling, straining  - Do not lift anything 10lbs or heavier     MEDICATION:   take your medications as explained ( see discharge paperwork)   If you received a STENT, you will be taking antiplatelet medications to KEEP YOUR STENT OPEN ( eg: Aspirin, Plavix, Brilinta, Effient, etc).  Take as prescribed DO NOT STOP taking them without consulting with your cardiologist first.     Follow heart healthy diet reccomended by your doctor, , if you smoke STOP SMOKING ( may call [phone] for center of tobacco control if you need assistance)     CALL your doctor to make appointment in 2 WEEKS     ***CALL YOUR DOCTOR***  if you experience: fever, chills, body aches, or severe pain, swelling, redness, heat or yellow discharge at incision site  If you experience Bleeding or excruciating pain at the procedural site, sweliing ( golf ball size) at your procedural site  If you experience CHEST PAIN  If you experience extremity numbness, tingling, temperature change ( of your procedural site)   If you are unable to reach your doctor, you may contact:   -Cardiology Office at [facility] at [phone] or   - [facility] [phone] - [facility] [phone]

## 2022-06-10 NOTE — CONSULT NOTE ADULT - SUBJECTIVE AND OBJECTIVE BOX
ENDOCRINE INITIAL CONSULT - DM2    HPI:  (patient transferred from [facility])    57 yo M, PMhx HTN, HLD, DM  from home, ambulates independently, sent by his cardiologist for T wave inversion. Pt stated that he went to see his cardiologist today and found to have abnormal EKG. Patient  denied having any chest pain,  N/V/D, headache. Endorsed to have mild shortness of breadth on exertion but not every time. Pt stated that last time he saw cardiologist 2 years ago and  his PMD asked him to go and see cardio given his medical illnesses. He was hospitalized 10 years ago for ACS and had echo which was normal. Never had stress test in his life. Father had stent at the age of 50 or 55. Never smoke.  cardiologist Dr. Stewart     ED course: EKG showed TWI II, III, AVF, V5 and V6 , Trop neg x1     (end of copied text)     Patient transferred here for cardiac cath .  (09 Jun 2022 14:59)      ENDOCRINE HISTORY     PAST MEDICAL & SURGICAL HISTORY:  HTN (hypertension)      HLD (hyperlipidemia)      Diabetes mellitus      S/P appendectomy          FAMILY HISTORY:  Family history of stent (Father)        Social History:      Home Medications:  alogliptin-metformin 12.5 mg-1000 mg oral tablet: 1 tab(s) orally 2 times a day. DO NOT TAKE on 6/10 or 6/11, restart on Sunday 6/12. (10 Alexis 2022 00:49)  aspirin 81 mg oral delayed release tablet: 1 tab(s) orally once a day (09 Jun 2022 15:37)  gemfibrozil 600 mg oral tablet: 1 tab(s) orally 2 times a day (09 Jun 2022 15:37)  insulin glargine 100 units/mL subcutaneous solution: 60 unit(s) subcutaneous once a day (at bedtime) (09 Jun 2022 15:37)  Lipitor 40 mg oral tablet: 1 tab(s) orally once a day (09 Jun 2022 15:37)  lisinopril-hydrochlorothiazide 20 mg-25 mg oral tablet: 1 tab(s) orally once a day (09 Jun 2022 15:37)  Vitamin B12 1000 mcg oral tablet: 1 tab(s) orally once a day (09 Jun 2022 15:37)  Vitamin D3 25 mcg (1000 intl units) oral tablet: 1 tab(s) orally once a day (09 Jun 2022 15:37)      MEDICATIONS  (STANDING):  aspirin enteric coated 81 milliGRAM(s) Oral daily  atorvastatin 40 milliGRAM(s) Oral at bedtime  cholecalciferol 1000 Unit(s) Oral daily  clopidogrel Tablet 75 milliGRAM(s) Oral daily  cyanocobalamin 1000 MICROGram(s) Oral daily  dextrose 5%. 1000 milliLiter(s) (50 mL/Hr) IV Continuous <Continuous>  dextrose 5%. 1000 milliLiter(s) (100 mL/Hr) IV Continuous <Continuous>  dextrose 50% Injectable 25 Gram(s) IV Push once  dextrose 50% Injectable 12.5 Gram(s) IV Push once  dextrose 50% Injectable 25 Gram(s) IV Push once  gemfibrozil 600 milliGRAM(s) Oral two times a day  glucagon  Injectable 1 milliGRAM(s) IntraMuscular once  hydrochlorothiazide 25 milliGRAM(s) Oral daily  insulin glargine Injectable (LANTUS) 48 Unit(s) SubCutaneous at bedtime  insulin lispro (ADMELOG) corrective regimen sliding scale   SubCutaneous at bedtime  insulin lispro (ADMELOG) corrective regimen sliding scale   SubCutaneous three times a day before meals  lisinopril 20 milliGRAM(s) Oral daily  metoprolol tartrate 25 milliGRAM(s) Oral every 12 hours  sodium chloride 0.9% lock flush 3 milliLiter(s) IV Push every 8 hours  sodium chloride 0.9%. 1000 milliLiter(s) (75 mL/Hr) IV Continuous <Continuous>    MEDICATIONS  (PRN):  dextrose Oral Gel 15 Gram(s) Oral once PRN Blood Glucose LESS THAN 70 milliGRAM(s)/deciliter      Allergies    No Known Allergies    Intolerances        REVIEW OF SYSTEMS  Constitutional: No fever  Eyes: No blurry vision  Neuro: No tremors  HEENT: No pain  Cardiovascular: No chest pain, palpitations  Respiratory: No SOB, no cough  GI: No nausea, vomiting, abdominal pain  : No dysuria  Skin: no rash  Psych: no depression  Endocrine: no polyuria, polydipsia  Hem/lymph: no swelling  Osteoporosis: no fractures  ALL OTHER SYSTEMS REVIEWED AND NEGATIVE       PHYSICAL EXAM   Vital Signs Last 24 Hrs  T(C): 36.6 (10 Alexis 2022 09:38), Max: 36.7 (10 Alexis 2022 04:36)  T(F): 97.8 (10 Alexis 2022 09:38), Max: 98 (10 Alexis 2022 04:36)  HR: 70 (10 Alexis 2022 09:38) (70 - 95)  BP: 110/72 (10 Alexis 2022 09:38) (110/72 - 142/79)  BP(mean): 76 (10 Alexis 2022 04:36) (76 - 105)  RR: 17 (10 Alexis 2022 09:38) (16 - 18)  SpO2: 97% (10 Alexis 2022 09:38) (94% - 97%)  GENERAL: NAD, well-groomed, well-developed  EYES: No proptosis, no lid lag, anicteric  HEENT:  Atraumatic, Normocephalic, moist mucous membranes  THYROID: Normal size, no palpable nodules  RESPIRATORY: Clear to auscultation bilaterally; No rales, rhonchi, wheezing  CARDIOVASCULAR: Regular rate and rhythm; No murmurs; no peripheral edema  GI: Soft, nontender, non distended, normal bowel sounds  SKIN: Dry, intact, No rashes or lesions  MUSCULOSKELETAL: Full range of motion, normal strength  NEURO: sensation intact, extraocular movements intact, no tremor  PSYCH: Alert and oriented x 3, normal affect, normal mood  CUSHING'S SIGNS: no striae    CAPILLARY BLOOD GLUCOSE      POCT Blood Glucose.: 271 mg/dL (10 Alexis 2022 12:26) 4A  POCT Blood Glucose.: 162 mg/dL (10 Alexis 2022 11:15) X  POCT Blood Glucose.: 126 mg/dL (10 Alexis 2022 07:04) X  POCT Blood Glucose.: 287 mg/dL (09 Jun 2022 21:00) 48L + 1A  POCT Blood Glucose.: 294 mg/dL (09 Jun 2022 14:52) 6A      A1C with Estimated Average Glucose Result: 12.0 % (06-08-22 @ 09:54)                          14.2   4.98  )-----------( 254      ( 10 Alexis 2022 01:45 )             41.0       06-10    135  |  100  |  18  ----------------------------<  228<H>  4.0   |  24  |  0.80    Ca    9.1      10 Alexis 2022 01:52    TPro  6.9  /  Alb  3.5  /  TBili  0.5  /  DBili  x   /  AST  32  /  ALT  60  /  AlkPhos  70  06-09          LIPIDS    RADIOLOGY ENDOCRINE INITIAL CONSULT - DM2    HPI:  (patient transferred from [facility])    57 yo M, PMhx HTN, HLD, DM  from home, ambulates independently, sent by his cardiologist for T wave inversion. Pt stated that he went to see his cardiologist today and found to have abnormal EKG. Patient  denied having any chest pain,  N/V/D, headache. Endorsed to have mild shortness of breadth on exertion but not every time. Pt stated that last time he saw cardiologist 2 years ago and  his PMD asked him to go and see cardio given his medical illnesses. He was hospitalized 10 years ago for ACS and had echo which was normal. Never had stress test in his life. Father had stent at the age of 50 or 55. Never smoke.  cardiologist Dr. Stewart     ED course: EKG showed TWI II, III, AVF, V5 and V6 , Trop neg x1     (end of copied text)     Patient transferred here for cardiac cath .  (09 Jun 2022 14:59)      ENDOCRINE HISTORY  DM2 dx about 23 years ago  Follows w/ his PCP   Current Home Regimen: 1 tablet twice daily (believes it is Alogliptin-Metformin 12.5mg-1000mg, states it sounds right) and long acting insulin pen, blue colored, 60 units at bedtime. Reports compliance w/ this regimen but says that his sugars are always high despite this.  Says he does not check regularly but when he does check, he says its always 200s, 300s, 400s at time. No hypoglycemia. BG levels better controlled if he eats a lighter meal like spinach soup. Admits that he eats a lot of carbohydrates: rice, green plantains. Tries to eat whole grain bread instead of white bread.   No regular exercise.   No known complications from his DM per pt. Last saw ophto 1.5 years ago, no hx of retinopathy. No foot complaints, numbness/tingling. Reports frequent polyuria, polydipsia, nocturia.   +Family hx of dm & cardiac disease.     PAST MEDICAL & SURGICAL HISTORY:  HTN (hypertension)      HLD (hyperlipidemia)      Diabetes mellitus      S/P appendectomy          FAMILY HISTORY:  Family history of stent (Father)        Social History: no longer working, lives w/ family.       Home Medications:  alogliptin-metformin 12.5 mg-1000 mg oral tablet: 1 tab(s) orally 2 times a day. DO NOT TAKE on 6/10 or 6/11, restart on Sunday 6/12. (10 Alexis 2022 00:49)  aspirin 81 mg oral delayed release tablet: 1 tab(s) orally once a day (09 Jun 2022 15:37)  gemfibrozil 600 mg oral tablet: 1 tab(s) orally 2 times a day (09 Jun 2022 15:37)  insulin glargine 100 units/mL subcutaneous solution: 60 unit(s) subcutaneous once a day (at bedtime) (09 Jun 2022 15:37)  Lipitor 40 mg oral tablet: 1 tab(s) orally once a day (09 Jun 2022 15:37)  lisinopril-hydrochlorothiazide 20 mg-25 mg oral tablet: 1 tab(s) orally once a day (09 Jun 2022 15:37)  Vitamin B12 1000 mcg oral tablet: 1 tab(s) orally once a day (09 Jun 2022 15:37)  Vitamin D3 25 mcg (1000 intl units) oral tablet: 1 tab(s) orally once a day (09 Jun 2022 15:37)      MEDICATIONS  (STANDING):  aspirin enteric coated 81 milliGRAM(s) Oral daily  atorvastatin 40 milliGRAM(s) Oral at bedtime  cholecalciferol 1000 Unit(s) Oral daily  clopidogrel Tablet 75 milliGRAM(s) Oral daily  cyanocobalamin 1000 MICROGram(s) Oral daily  dextrose 5%. 1000 milliLiter(s) (50 mL/Hr) IV Continuous <Continuous>  dextrose 5%. 1000 milliLiter(s) (100 mL/Hr) IV Continuous <Continuous>  dextrose 50% Injectable 25 Gram(s) IV Push once  dextrose 50% Injectable 12.5 Gram(s) IV Push once  dextrose 50% Injectable 25 Gram(s) IV Push once  gemfibrozil 600 milliGRAM(s) Oral two times a day  glucagon  Injectable 1 milliGRAM(s) IntraMuscular once  hydrochlorothiazide 25 milliGRAM(s) Oral daily  insulin glargine Injectable (LANTUS) 48 Unit(s) SubCutaneous at bedtime  insulin lispro (ADMELOG) corrective regimen sliding scale   SubCutaneous at bedtime  insulin lispro (ADMELOG) corrective regimen sliding scale   SubCutaneous three times a day before meals  lisinopril 20 milliGRAM(s) Oral daily  metoprolol tartrate 25 milliGRAM(s) Oral every 12 hours  sodium chloride 0.9% lock flush 3 milliLiter(s) IV Push every 8 hours  sodium chloride 0.9%. 1000 milliLiter(s) (75 mL/Hr) IV Continuous <Continuous>    MEDICATIONS  (PRN):  dextrose Oral Gel 15 Gram(s) Oral once PRN Blood Glucose LESS THAN 70 milliGRAM(s)/deciliter      Allergies    No Known Allergies    Intolerances        REVIEW OF SYSTEMS  Constitutional: No fever  Eyes: No blurry vision  Neuro: No tremors  HEENT: No pain  Cardiovascular: No chest pain, palpitations  Respiratory: No SOB, no cough  GI: No nausea, vomiting, abdominal pain  : No dysuria  Skin: no rash  Psych: no depression  Endocrine: +polyuria, polydipsia  Hem/lymph: no swelling  Osteoporosis: no fractures  ALL OTHER SYSTEMS REVIEWED AND NEGATIVE       PHYSICAL EXAM   Vital Signs Last 24 Hrs  T(C): 36.6 (10 Alexis 2022 09:38), Max: 36.7 (10 Alexis 2022 04:36)  T(F): 97.8 (10 Alexis 2022 09:38), Max: 98 (10 Alexis 2022 04:36)  HR: 70 (10 Alexis 2022 09:38) (70 - 95)  BP: 110/72 (10 Alexis 2022 09:38) (110/72 - 142/79)  BP(mean): 76 (10 Alexis 2022 04:36) (76 - 105)  RR: 17 (10 Alexis 2022 09:38) (16 - 18)  SpO2: 97% (10 Alexis 2022 09:38) (94% - 97%)  GENERAL: NAD, well-groomed, well-developed  EYES: No proptosis, no lid lag, anicteric  HEENT:  Atraumatic, Normocephalic, moist mucous membranes  THYROID: Normal size, no palpable nodules, nontender   RESPIRATORY: Clear to auscultation bilaterally; No rales, rhonchi, wheezing  CARDIOVASCULAR: Regular rate and rhythm; No murmurs; no peripheral edema  GI: Soft, nontender, non distended, normal bowel sounds  SKIN: Dry, intact, No rashes or lesions  MUSCULOSKELETAL: Full range of motion, normal strength  NEURO: sensation intact, extraocular movements intact, no tremor  PSYCH: Alert and oriented x 3, reactive affect/mood   CUSHING'S SIGNS: no striae    CAPILLARY BLOOD GLUCOSE      POCT Blood Glucose.: 271 mg/dL (10 Alexis 2022 12:26) 4A  POCT Blood Glucose.: 162 mg/dL (10 Alexis 2022 11:15) X  POCT Blood Glucose.: 126 mg/dL (10 Alexis 2022 07:04) X  POCT Blood Glucose.: 287 mg/dL (09 Jun 2022 21:00) 48L + 1A  POCT Blood Glucose.: 294 mg/dL (09 Jun 2022 14:52) 6A      A1C with Estimated Average Glucose Result: 12.0 % (06-08-22 @ 09:54)                          14.2   4.98  )-----------( 254      ( 10 Alexis 2022 01:45 )             41.0       06-10    135  |  100  |  18  ----------------------------<  228<H>  4.0   |  24  |  0.80    Ca    9.1      10 Alexis 2022 01:52    TPro  6.9  /  Alb  3.5  /  TBili  0.5  /  DBili  x   /  AST  32  /  ALT  60  /  AlkPhos  70  06-09          LIPIDS    RADIOLOGY

## 2022-06-10 NOTE — DISCHARGE NOTE NURSING/CASE MANAGEMENT/SOCIAL WORK - NSDCPEFALRISK_GEN_ALL_CORE
For information on Fall & Injury Prevention, visit: [email or web address] OR  [email or web address] OR  https://www.cdc.gov/steadi/patient.html

## 2022-06-10 NOTE — DISCHARGE NOTE PROVIDER - NSDCCPCAREPLAN_GEN_ALL_CORE_FT
PRINCIPAL DISCHARGE DIAGNOSIS  Diagnosis: CAD (coronary artery disease)  Assessment and Plan of Treatment: Do not stop your aspirin or Plavix unless instructed to do so by your cardiologist, they help keep your stented arteries open.   No heavy lifting or pushing/pulling with procedure arm for 2 weeks. No driving for 2 days. You may shower 24 hours following the procedure but avoid baths/swimming for 1 week. Check your wrist site for bleeding and/or swelling daily following procedure and call your doctor immediately if it occurs or if you experience increased pain at the site. Follow up with your cardiologist in 1-2 weeks. You may call [facility] if you have any questions/concerns regarding your procedure [phone].      SECONDARY DISCHARGE DIAGNOSES  Diagnosis: HTN (hypertension)  Assessment and Plan of Treatment: Continue with your blood pressure medications; eat a heart healthy diet with low salt diet; exercise regularly (consult with your physician or cardiologist first); maintain a heart healthy weight; if you smoke - quit (A resource to help you stop smoking is the [facility] [employer] – phone number [phone].); include healthy ways to manage stress. Continue to follow with your primary care physician or cardiologist.    Diagnosis: HLD (hyperlipidemia)  Assessment and Plan of Treatment: Continue with your cholesterol medications. Eat a heart healthy diet that is low in saturated fats and salt, and includes whole grains, fruits, vegetables and lean protein; exercise regularly (consult with your physician or cardiologist first); maintain a heart healthy weight; if you smoke - quit (A resource to help you stop smoking is the [facility] for [employer] Control – phone number [phone].). Continue to follow with your primary physician or cardiologist.    Diagnosis: DM type 2, goal HbA1c < 7%  Assessment and Plan of Treatment: Your Hemoglobin A1c level is   Continue to follow with your primary care MD or your endocrinologist.  Follow a heart healthy diabetic diet. If you check your fingerstick glucose at home, call your MD if it is greater than 250mg/dL on 2 occasions or less than 100mg/dL on 2 occasions. Know signs of low blood sugar, such as: dizziness, shakiness, sweating, confusion, hunger, nervousness-drink 4 ounces apple juice if occurs and call your doctor. Know early signs of high blood sugar, such as: frequent urination, increased thirst, blurry vision, fatigue, headache - call your doctor if this occurs. Follow with other practitioners to care for your diabetes, such as ophthalmologist and podiatrist.

## 2022-06-10 NOTE — DISCHARGE NOTE PROVIDER - NSDCCPTREATMENT_GEN_ALL_CORE_FT
PRINCIPAL PROCEDURE  Procedure: Placement of coronary artery stent  Findings and Treatment: one stent to the mid LAD

## 2022-06-10 NOTE — DISCHARGE NOTE PROVIDER - CARE PROVIDER_API CALL
Cricket Huizar)  Cardiology Medicine  [address] [facility], [place] [address]  Phone: [phone]  Fax: [phone]  Follow Up Time: 2 weeks   Cricket Huizar)  Cardiology Medicine  [address] [facility], [address]  [place] [address]  Phone: [phone]  Fax: [phone]  Follow Up Time: 2 weeks    Reyes, Elsa J (MD)  Internal Medicine  [address] [place] [address]  Phone: [phone]  Fax: [phone]  Follow Up Time: 2 weeks

## 2022-06-10 NOTE — DISCHARGE NOTE PROVIDER - NSDCACTIVITY_GEN_ALL_CORE
Showering allowed/Stairs allowed/Walking - Indoors allowed/No heavy lifting/straining/Walking - Outdoors allowed
No cyanosis, no pallor, no jaundice, no rash

## 2022-06-10 NOTE — CHART NOTE - NSCHARTNOTEFT_GEN_A_CORE
endocrine recs noted   patient now reports to be allergic to Trulicity, had anaphylaxis reaction in the past   Trulicity DC, Dr Oliveira made perry  ok to dc at this time on all recommended regimen expect for Trulicity   medication added to patient allergy profile

## 2022-06-10 NOTE — DISCHARGE NOTE PROVIDER - PROVIDER TOKENS
PROVIDER:[TOKEN:[[id number]],FOLLOWUP:[2 weeks]] PROVIDER:[TOKEN:[[id number]],FOLLOWUP:[2 weeks]],PROVIDER:[TOKEN:[[id number]],FOLLOWUP:[2 weeks]]

## 2022-06-10 NOTE — PROGRESS NOTE ADULT - ASSESSMENT
_________________________________________________________________________________________  ========>>  M E D I C A L   A T T E N D I N G    F O L L O W  U P  N O T E  <<=========  -----------------------------------------------------------------------------------------------------    - Patient seen and examined by me earlier today.   - In summary,  YESSI IRVIN is a 56y year old man transferred for cath   - Patient today overall doing ok, comfortable, eating OK. mild h/a otherwise doing ok     pt overall feels well post LAD stent, ambulating,  planned to go home today     ==================>> REVIEW OF SYSTEM <<=================    GEN: no fever, no chills, no pain  RESP: no SOB, no cough, no sputum  CVS: no chest pain, no palpitations, no edema  GI: no abdominal pain, no nausea, no constipation, no diarrhea  : no dysuria, no frequency, no hematuria  Neuro: no headache, no dizziness  Derm : no itching, no rash    ==================>> PHYSICAL EXAM <<=================    GEN: A&O X 3 , NAD , comfortable, pleasant, calm   HEENT: NCAT, PERRL, MMM, hearing intact  Neck: supple , no JVD appreciated  CVS: S1S2 , regular , No M/R/G appreciated  PULM: CTA B/L,  no W/R/R appreciated  ABD.: soft. non tender, non distended,  bowel sounds present  Extrem: intact pulses , no edema   PSYCH : normal mood,  not anxious                             ( Note written / Date of service 06-10-22 )    ==================>> MEDICATIONS <<====================    aspirin enteric coated 81 milliGRAM(s) Oral daily  atorvastatin 40 milliGRAM(s) Oral at bedtime  cholecalciferol 1000 Unit(s) Oral daily  clopidogrel Tablet 75 milliGRAM(s) Oral daily  cyanocobalamin 1000 MICROGram(s) Oral daily  dextrose 5%. 1000 milliLiter(s) IV Continuous <Continuous>  dextrose 5%. 1000 milliLiter(s) IV Continuous <Continuous>  dextrose 50% Injectable 25 Gram(s) IV Push once  dextrose 50% Injectable 12.5 Gram(s) IV Push once  dextrose 50% Injectable 25 Gram(s) IV Push once  gemfibrozil 600 milliGRAM(s) Oral two times a day  glucagon  Injectable 1 milliGRAM(s) IntraMuscular once  hydrochlorothiazide 25 milliGRAM(s) Oral daily  insulin glargine Injectable (LANTUS) 48 Unit(s) SubCutaneous at bedtime  insulin lispro (ADMELOG) corrective regimen sliding scale   SubCutaneous at bedtime  insulin lispro (ADMELOG) corrective regimen sliding scale   SubCutaneous three times a day before meals  lisinopril 20 milliGRAM(s) Oral daily  metoprolol tartrate 25 milliGRAM(s) Oral every 12 hours  sodium chloride 0.9% lock flush 3 milliLiter(s) IV Push every 8 hours  sodium chloride 0.9%. 1000 milliLiter(s) IV Continuous <Continuous>    MEDICATIONS  (PRN):  dextrose Oral Gel 15 Gram(s) Oral once PRN Blood Glucose LESS THAN 70 milliGRAM(s)/deciliter    ___________  Active diet:  Diet, Consistent Carbohydrate w/Evening Snack  ___________________    ==================>> VITAL SIGNS <<==================  Height (cm): 167.6  Weight (kg): 68.5  BMI (kg/m2): 24.4  Vital Signs Last 24 HrsT(C): 36.4 (06-10-22 @ 16:20)  T(F): 97.6 (06-10-22 @ 16:20), Max: 98 (06-10-22 @ 04:36)  HR: 77 (06-10-22 @ 16:20) (70 - 95)  BP: 105/73 (06-10-22 @ 16:20)  RR: 16 (06-10-22 @ 16:20) (16 - 18)  SpO2: 96% (06-10-22 @ 16:20) (94% - 97%)      CAPILLARY BLOOD GLUCOSE  POCT Blood Glucose.: 275 mg/dL (10 Alexis 2022 16:09)  POCT Blood Glucose.: 271 mg/dL (10 Alexis 2022 12:26)  POCT Blood Glucose.: 162 mg/dL (10 Alexis 2022 11:15)  POCT Blood Glucose.: 126 mg/dL (10 Alexis 2022 07:04)  POCT Blood Glucose.: 287 mg/dL (09 Jun 2022 21:00)     ==================>> LAB AND IMAGING <<==================                        14.2   4.98  )-----------( 254      ( 10 Alexis 2022 01:45 )             41.0        06-10    135  |  100  |  18  ----------------------------<  228<H>  4.0   |  24  |  0.80    Ca    9.1      10 Alexis 2022 01:52    TPro  6.9  /  Alb  3.5  /  TBili  0.5  /  DBili  x   /  AST  32  /  ALT  60  /  AlkPhos  70  06-09    WBC count:   4.98 <<== ,  4.49 <<== ,  5.39 <<==   Hemoglobin:   14.2 <<==,  13.9 <<==,  14.7 <<==  platelets:  254 <==, 266 <==, 301 <==    Creatinine:  0.80  <<==, 0.84  <<==, 0.76  <<==, 0.86  <<==  Sodium:   135  <==, 137  <==, 137  <==, 136  <==       AST:          32 <== , 32 <== , 32 <==      ALT:        60  <== , 55  <== , 57  <==      AP:        70  <=, 66  <=, 78  <=     Bili:        0.5  <=, 0.4  <=, 0.4  <=             Lipid profile:  (06-08-22)     Total: 157     LDL  : (p)     HDL  :28     TG   :285     HgA1C:   (06-08-22)          (06-08-22)      12.0    pending official cath report ( prelim : LAD stent)  ___________________________________________________________________________________  ===============>>  A S S E S S M E N T   A N D   P L A N <<===============  ------------------------------------------------------------------------------------------    · Assessment	  55 yo M, from home, ambulates independently, sent by his cardiologist for T wave inversion. Pt stated that he went to see his cardiologist today and found to have abnormal EKG. Admitted for cardiac eval     Problem/Plan - 1:  ·  Problem: CAD and Abnormal EKG  - post LAD stent  - DAPT, statin  - appreciated cardiology follow up   - discussed with pt re close OP follow up, compliance with medications.. all questions answered   -Telemonitoring  - Continue Current medications otherwise and monitor.  - f/u with cardio : Dr Spears as OP    Problem/Plan - 2:  ·  Problem: Diabetes mellitus. poorly controlled with elevated A1C   - discussed with pt re close OP follow up, compliance with medications.. all questions answered   - appreciated endocrine eval and recom    checking meds against insurance coverage...  - close follow up and monitoring as OP with PMD and endocrine  - would benefit from nutrition evaluation / consultation    Problem/Plan - 3:  ·  Problem: HLD (hyperlipidemia).   ·  Plan: On Lipitor + Gemfibrozil   Resume home med     Problem/Plan - 4:  ·  Problem: HTN (hypertension).   ·  Plan: On lisinopril -HCTz  Resume home med  Monitor BP.  cardio f/u as OP    --------------------------------------------  Case discussed with pt, NP  Education given on findings and plan of care  ___________________________  H. ALEXANDRA Rivera.  Pager: [phone]

## 2022-06-10 NOTE — CONSULT NOTE ADULT - ATTENDING COMMENTS
Agree with assessment and plan as above by Dr. Yeh. Reviewed all pertinent labs, glucose values, and imaging studies. Modifications made as indicated above. Pt. with severely uncontrolled diabetes with A1c of 12%. Recommend basal + orals. Ideally would prefer GLP-1 for additional CV and weight loss benefits but patient had allergic reaction in the past. Can add SGLT2 to alogliptin-metformin and discharge on lower dose of basal given lower insulin requirements in-patient with better adherence to diet as we discussed at length.     Diaz Oliveira D.O  [phone]

## 2022-06-10 NOTE — DISCHARGE NOTE PROVIDER - HOSPITAL COURSE
HPI:  (patient transferred from [facility])    55 yo M, PMhx HTN, HLD, DM  from home, ambulates independently, sent by his cardiologist for T wave inversion. Pt stated that he went to see his cardiologist today and found to have abnormal EKG. Patient  denied having any chest pain,  N/V/D, headache. Endorsed to have mild shortness of breadth on exertion but not every time. Pt stated that last time he saw cardiologist 2 years ago and  his PMD asked him to go and see cardio given his medical illnesses. He was hospitalized 10 years ago for ACS and had echo which was normal. Never had stress test in his life. Father had stent at the age of 50 or 55. Never smoke.  cardiologist Dr. Huizar     ED course: EKG showed TWI II, III, AVF, V5 and V6 , Trop neg x1     (end of copied text)     Patient transferred here for cardiac cath .  (09 Jun 2022 14:59)    6/9 cardiac cath with one stent to the mid LAD. Right radial site without swelling, bleeding.   HPI:  (patient transferred from [facility])    57 yo M, PMhx HTN, HLD, DM  from home, ambulates independently, sent by his cardiologist for T wave inversion. Pt stated that he went to see his cardiologist today and found to have abnormal EKG. Patient  denied having any chest pain,  N/V/D, headache. Endorsed to have mild shortness of breadth on exertion but not every time. Pt stated that last time he saw cardiologist 2 years ago and  his PMD asked him to go and see cardio given his medical illnesses. He was hospitalized 10 years ago for ACS and had echo which was normal. Never had stress test in his life. Father had stent at the age of 50 or 55. Never smoke.  cardiologist Dr. Huizar     ED course: EKG showed TWI II, III, AVF, V5 and V6 , Trop neg x1     (end of copied text)     Patient transferred here for cardiac cath .  (09 Jun 2022 14:59)    6/9 cardiac cath with one stent to the mid LAD. Right radial site without swelling, bleeding.  6/10 cleared from cards stand point for discharge, pt remains chest pain free, RRA soft, w/o bleeding or hematoma +2 radial pulses.   A1C 12 with hyperglycemia, endo called, recs made and new meds sent to [facility]

## 2022-06-10 NOTE — CHART NOTE - NSCHARTNOTEFT_GEN_A_CORE
Patient c/o left side chest pain at 01:30, 7 out of 10, sharp sensation that did not resolve on it's own. 12 lead EKG with ST elevations in leads V3 and V4, /91, HR 77 bpm, sinus rhythm. Morphine 2 mg IVP x1 given, cardiac enzymes, CBC and BMP drawn and sent to lab. Seen at bedside by Cardiology Fellow Dr. CELIA Heart, Metoprolol 25 mg PO Q12h started as ordered. Patient reports pain relief with morphine dose, resting in bed.      Ward Sharma, [facility]    [phone]

## 2022-06-10 NOTE — DISCHARGE NOTE PROVIDER - NSDCPNSUBOBJ_GEN_ALL_CORE
Patient is a 56y old  Male who presents with a chief complaint of         Allergies    No Known Allergies    Intolerances        Medications:  aspirin enteric coated 81 milliGRAM(s) Oral daily  atorvastatin 40 milliGRAM(s) Oral at bedtime  cholecalciferol 1000 Unit(s) Oral daily  cyanocobalamin 1000 MICROGram(s) Oral daily  dextrose 5%. 1000 milliLiter(s) IV Continuous <Continuous>  dextrose 5%. 1000 milliLiter(s) IV Continuous <Continuous>  dextrose 50% Injectable 25 Gram(s) IV Push once  dextrose 50% Injectable 12.5 Gram(s) IV Push once  dextrose 50% Injectable 25 Gram(s) IV Push once  dextrose Oral Gel 15 Gram(s) Oral once PRN  gemfibrozil 600 milliGRAM(s) Oral two times a day  glucagon  Injectable 1 milliGRAM(s) IntraMuscular once  hydrochlorothiazide 25 milliGRAM(s) Oral daily  insulin glargine Injectable (LANTUS) 48 Unit(s) SubCutaneous at bedtime  insulin lispro (ADMELOG) corrective regimen sliding scale   SubCutaneous at bedtime  insulin lispro (ADMELOG) corrective regimen sliding scale   SubCutaneous three times a day before meals  lisinopril 20 milliGRAM(s) Oral daily  sodium chloride 0.9% lock flush 3 milliLiter(s) IV Push every 8 hours  sodium chloride 0.9%. 1000 milliLiter(s) IV Continuous <Continuous>      Vitals:  T(C): 36.6 (22 @ 18:45), Max: 36.8 (22 @ 07:28)  HR: 74 (22 @ 21:45) (74 - 95)  BP: 130/96 (22 @ 22:45) (106/69 - 142/79)  BP(mean): 103 (22 @ 22:45) (80 - 105)  RR: 16 (22 @ 21:45) (16 - 20)  SpO2: 97% (22 @ 21:45) (94% - 98%)  Wt(kg): --  Daily Height in cm: 167.64 (2022 14:59)    Daily Weight in k.5 (2022 14:59)  I&O's Summary        Physical Exam:  Appearance: Normal  Eyes: PERRL, EOMI  HENT: Normal oral muscosa, NC/AT  Cardiovascular: S1S2, RRR, No M/R/G, no JVD, No Lower extremity edema  Procedural Access Site: No hematoma, Non-tender to palpation, 2+ pulse, No bruit, No Ecchymosis  Respiratory: Clear to auscultation bilaterally  Gastrointestinal: Soft, Non tender, Normal Bowel Sounds  Musculoskeletal: No clubbing, No joint deformity   Neurologic: Non-focal  Lymphatic: No lymphadenopathy  Psychiatry: AAOx3, Mood & affect appropriate  Skin: No rashes, No ecchymoses, No cyanosis        137  |  103  |  18  ----------------------------<  158<H>  4.3   |  29  |  0.84    Ca    9.5      2022 07:02  Phos  3.8     06-  Mg     2.1     -    TPro  6.9  /  Alb  3.5  /  TBili  0.5  /  DBili  x   /  AST  32  /  ALT  60  /  AlkPhos  70  06-          Serum Pro-Brain Natriuretic Peptide: 10 pg/mL ( @ 12:17)    Lipid panel   Hgb A1c                         13.9   4.49  )-----------( 266      ( 2022 06:27 )             41.0         ECG: SR 77 bpm    CAD: Monitor radial site for swelling, bleeding  Continue ASA, Plavix     HTN: Continue antihypertensive medications with hold parameters     HLD: continue statin, confirm lipid panel results     DM type 2: Humalog insulin sliding scale with finger sticks before meals and QHS, Lantus sc QHS. HgbA1c level 12.0     Interpretation of Telemetry:

## 2022-06-10 NOTE — CONSULT NOTE ADULT - ASSESSMENT
Uncontrolled DM2 w/ Hyperglycemia   A1c 12%  Recommendations:   - Lantus SC QHS   - Admelog SC Premeal/TIDAC   - Admelog Low Correction Scale Premeal & Low Correction Scale Bedtime   - Goal -180  - FS Blood glucose monitoring Premeal/Bedtime   - Hypoglycemia protocol   - Carb Consistent Diet   - Nutrition consult   DC Planning: If patient amenable, consider addition of SGLT2 inhibitor to current home regimen vs discontinue Alogliptin & add GLP1 agonist  instead with Metformin 1gm BID + Basal Insulin. TBD.     HTN  Recommendations:   - outpt BP goal < 130/80   - defer to primary team   - outpatient annual urinary microalb/cr ratio  - once acei inhibitor per med rec     HLD  Recommendations:   - LDL goal < 70   - defer to primary team   - outpatient fasting lipid profile   - on Gemfibrozil & Atorvastatin inpatient & outpatient, per med rec; would consider switching to fenofibrate given drug interaction between statins & Gemfibrozil    Discussed w/ Dr. Tri Yeh, DO   Endocrine Fellow  For follow up questions, discharge recommendations, or new consults please call answering service at [phone] (weekdays), [phone] (nights/weekends). For nonurgent matters, please email [email or web address] or [email or web address]      Uncontrolled DM2 w/ Hyperglycemia   A1c 12%  Recommendations:   - Lantus SC QHS   - Admelog SC Premeal/TIDAC   - Admelog Low Correction Scale Premeal & Low Correction Scale Bedtime   - Goal -180  - FS Blood glucose monitoring Premeal/Bedtime   - Hypoglycemia protocol   - Carb Consistent Diet   - Nutrition consult   DC Planning: If patient amenable, consider addition of SGLT2 inhibitor to current home regimen vs discontinue Alogliptin & add GLP1 agonist  instead with Metformin 1gm BID + Basal Insulin. TBD.     HTN  Recommendations:   - outpt BP goal < 130/80   - defer to primary team   - outpatient annual urinary microalb/cr ratio  - once acei inhibitor per med rec     HLD  Recommendations:   - LDL goal < 70   - defer to primary team   - outpatient fasting lipid profile   - on Gemfibrozil & Atorvastatin inpatient & outpatient, per med rec; would consider switching to fenofibrate given drug interaction between statins & Gemfibrozil    Discussed w/ Dr. Tee Yeh, DO   Endocrine Fellow  For follow up questions, discharge recommendations, or new consults please call answering service at [phone] (weekdays), [phone] (nights/weekends). For nonurgent matters, please email [email or web address] or [email or web address]    55 yo M, PMhx HTN, HLD, DM  from home, ambulates independently, sent by his cardiologist for T wave inversion noted on EKG. Transferred to [facility] for cardiac cath. Endocrinology consulted for uncontrolled dm2 w/ hyperglycemia, A1c 12%.     Uncontrolled DM2 w/ Hyperglycemia   A1c 12%  Recommendations:   - Lantus 35 units SC QHS   - Admelog 8 units SC Premeal/TIDAC   - Admelog Low Correction Scale Premeal & Low Correction Scale Bedtime   - Goal -180  - FS Blood glucose monitoring Premeal/Bedtime   - Hypoglycemia protocol   - Carb Consistent Diet   - Nutrition consult   DC Planning: If patient is being discharged today, start SGLT2 inhibitor (Jardiance 10mg, Farxiga 5mg - whichever is covered by patient's insurance) and start  GLP 1 agonist (Ozempic or Trulicity, whichever is covered by patient's insurance). Discontinue Alogliptin. Continue Metformin 1gm BID w/ meals and Decrease Basal insulin to 48 units SC At bedtime. Routine outpatient ophthalmology & podiatry follow up advised. Please ensure that patient has diabetes supplies/refills prior to discharge (insulin pen needles, lancets, test strips, alcohol swabs, working glucometer). Patient can follow up with endocrinology at the location provided below:     [facility]   [address] [address]  [phone]     HTN  Recommendations:   - outpt BP goal < 130/80   - defer to primary team   - outpatient annual urinary microalb/cr ratio  - once acei inhibitor per med rec     HLD  Recommendations:   - LDL goal < 70   - defer to primary team   - outpatient fasting lipid profile   - on Gemfibrozil & Atorvastatin inpatient & outpatient, per med rec; would consider switching to fenofibrate given drug interaction between statins & Gemfibrozil    Discussed w/ Dr. Tee Yeh, DO   Endocrine Fellow  For follow up questions, discharge recommendations, or new consults please call answering service at [phone] (weekdays), [phone] (nights/weekends). For nonurgent matters, please email [email or web address] or [email or web address]    57 yo M, PMhx HTN, HLD, DM  from home, ambulates independently, sent by his cardiologist for T wave inversion noted on EKG. Transferred to [facility] for cardiac cath. Endocrinology consulted for uncontrolled dm2 w/ hyperglycemia, A1c 12%.     Uncontrolled DM2 w/ Hyperglycemia   A1c 12%  Recommendations:   - Lantus 35 units SC QHS   - Admelog 8 units SC Premeal/TIDAC   - Admelog Low Correction Scale Premeal & Low Correction Scale Bedtime   - Goal -180  - FS Blood glucose monitoring Premeal/Bedtime   - Hypoglycemia protocol   - Carb Consistent Diet   - Nutrition consult   DC Planning: If patient is being discharged today, start SGLT2 inhibitor (Jardiance 10mg, Farxiga 5mg - whichever is covered by patient's insurance) and start  GLP 1 agonist (Ozempic or Trulicity, whichever is covered by patient's insurance). Discontinue Alogliptin. Continue Metformin 1gm BID w/ meals and Decrease Basal insulin to 48 units SC At bedtime. Routine outpatient ophthalmology & podiatry follow up advised. Please ensure that patient has diabetes supplies/refills prior to discharge (insulin pen needles, lancets, test strips, alcohol swabs, working glucometer). Patient can follow up with endocrinology at the location provided below:     [facility]   [address] [address]  [phone]     HTN  Recommendations:   - outpt BP goal < 130/80   - defer to primary team   - outpatient annual urinary microalb/cr ratio  - once acei inhibitor per med rec     HLD  Recommendations:   - LDL goal < 70   - defer to primary team   - outpatient fasting lipid profile   - on Gemfibrozil & Atorvastatin inpatient & outpatient, per med rec; would consider switching to fenofibrate given drug interaction between statins & Gemfibrozil    Discussed w/ Dr. Tee Yeh, DO   Endocrine Fellow  For follow up questions, discharge recommendations, or new consults please call answering service at [phone] (weekdays), [phone] (nights/weekends). For nonurgent matters, please email [email or web address] or [email or web address]    57 yo M, PMhx HTN, HLD, DM  from home, ambulates independently, sent by his cardiologist for T wave inversion noted on EKG. Transferred to [facility] for cardiac cath. Endocrinology consulted for uncontrolled dm2 w/ hyperglycemia, A1c 12%.     Uncontrolled DM2 w/ Hyperglycemia   A1c 12%  Recommendations:   - Lantus 35 units SC QHS   - Admelog 8 units SC Premeal/TIDAC   - Admelog Low Correction Scale Premeal & Low Correction Scale Bedtime   - Goal -180  - FS Blood glucose monitoring Premeal/Bedtime   - Hypoglycemia protocol   - Carb Consistent Diet   - Nutrition consult   DC Planning: If patient is being discharged today, start SGLT2 inhibitor (Jardiance 10mg, Farxiga 5mg - whichever is covered by patient's insurance) and start  GLP 1 agonist (Ozempic or Trulicity, whichever is covered by patient's insurance). Discontinue Alogliptin. Continue Metformin 1gm BID w/ meals and Decrease Basal insulin to 48 units SC At bedtime. Routine outpatient ophthalmology & podiatry follow up advised. Please ensure that patient has diabetes supplies/refills prior to discharge (insulin pen needles, lancets, test strips, alcohol swabs, working glucometer). Patient can follow up with endocrinology at the location provided below:     [facility]   [address] [address]  [phone]     HTN  Recommendations:   - outpt BP goal < 130/80   - defer to primary team   - outpatient annual urinary microalb/cr ratio  - once acei inhibitor per med rec     HLD  Recommendations:   - LDL goal < 70   - defer to primary team   - outpatient fasting lipid profile   - on Gemfibrozil & Atorvastatin, would suggest switching to fenofibrate given drug interaction between statins & Gemfibrozil    Discussed w/ Dr. Tee Yeh, DO   Endocrine Fellow  For follow up questions, discharge recommendations, or new consults please call answering service at [phone] (weekdays), [phone] (nights/weekends). For nonurgent matters, please email [email or web address] or [email or web address]    57 yo M, PMhx HTN, HLD, DM  from home, ambulates independently, sent by his cardiologist for T wave inversion noted on EKG. Transferred to [facility] for cardiac cath. Endocrinology consulted for uncontrolled dm2 w/ hyperglycemia, A1c 12% (high risk patient with severely uncontrolled diabetes with A1c of 12% at high risk of CAD and CVA with high level decision-making).     Uncontrolled DM2 w/ Hyperglycemia   A1c 12%  Recommendations:   - Lantus 35 units SC QHS   - Admelog 8 units SC Premeal/TIDAC   - Admelog Low Correction Scale Premeal & Low Correction Scale Bedtime   - Goal -180  - FS Blood glucose monitoring Premeal/Bedtime   - Hypoglycemia protocol   - Carb Consistent Diet   - Nutrition consult   DC Planning: If patient is being discharged today, start SGLT2 inhibitor (Jardiance 10mg, Farxiga 5mg - whichever is covered by patient's insurance) and Continue Alogliptin-Metformin as patient had allergic reaction to GLP-1 in the past, Decrease Basal insulin to 48 units SC At bedtime. Routine outpatient ophthalmology & podiatry follow up advised. Please ensure that patient has diabetes supplies/refills prior to discharge (insulin pen needles, lancets, test strips, alcohol swabs, working glucometer). Patient can follow up with endocrinology at the location provided below:     [facility]   [address] [place] [address] [id number] [phone]     HTN  Recommendations:   - outpt BP goal < 130/80   - defer to primary team   - outpatient annual urinary microalb/cr ratio  - once acei inhibitor per med rec     HLD  Recommendations:   - LDL goal < 70   - defer to primary team   - outpatient fasting lipid profile   - on Gemfibrozil & Atorvastatin, would suggest switching to fenofibrate given drug interaction between statins & Gemfibrozil    Discussed w/ Dr. Tee Yeh, DO   Endocrine Fellow  For follow up questions, discharge recommendations, or new consults please call answering service at [phone] (weekdays), [phone] (nights/weekends). For nonurgent matters, please email [email or web address] or [email or web address]

## 2022-06-10 NOTE — DISCHARGE NOTE NURSING/CASE MANAGEMENT/SOCIAL WORK - PATIENT PORTAL LINK FT
You can access the FollowMyHealth Patient Portal offered by [facility] by registering at the following website: [email or web address]. By joining [employer]’s FollowMyHealth portal, you will also be able to view your health information using other applications (apps) compatible with our system.

## 2024-08-23 NOTE — ED PROVIDER NOTE - CROS ED MARK PERT SYS NEG
Patient lives in a private house and has about 14 steps to negotiate few to go in the house and few inside the house. aki all pertinent systems negative